# Patient Record
Sex: FEMALE | Race: WHITE | NOT HISPANIC OR LATINO | Employment: UNEMPLOYED | URBAN - METROPOLITAN AREA
[De-identification: names, ages, dates, MRNs, and addresses within clinical notes are randomized per-mention and may not be internally consistent; named-entity substitution may affect disease eponyms.]

---

## 2018-02-27 ENCOUNTER — CONVERSION ENCOUNTER (OUTPATIENT)
Dept: SURGERY | Facility: CLINIC | Age: 48
End: 2018-02-27

## 2018-02-27 ENCOUNTER — OFFICE VISIT CONVERTED (OUTPATIENT)
Dept: SURGERY | Facility: CLINIC | Age: 48
End: 2018-02-27
Attending: SURGERY

## 2018-04-23 ENCOUNTER — OFFICE VISIT CONVERTED (OUTPATIENT)
Dept: SURGERY | Facility: CLINIC | Age: 48
End: 2018-04-23
Attending: SURGERY

## 2018-04-30 ENCOUNTER — OFFICE VISIT CONVERTED (OUTPATIENT)
Dept: ORTHOPEDIC SURGERY | Facility: CLINIC | Age: 48
End: 2018-04-30
Attending: ORTHOPAEDIC SURGERY

## 2019-06-10 ENCOUNTER — CONVERSION ENCOUNTER (OUTPATIENT)
Dept: SURGERY | Facility: CLINIC | Age: 49
End: 2019-06-10

## 2019-06-10 ENCOUNTER — OFFICE VISIT CONVERTED (OUTPATIENT)
Dept: SURGERY | Facility: CLINIC | Age: 49
End: 2019-06-10
Attending: SURGERY

## 2019-07-11 ENCOUNTER — HOSPITAL ENCOUNTER (OUTPATIENT)
Dept: PERIOP | Facility: HOSPITAL | Age: 49
Setting detail: HOSPITAL OUTPATIENT SURGERY
Discharge: HOME | End: 2019-07-11
Attending: SURGERY

## 2019-08-02 ENCOUNTER — OFFICE VISIT CONVERTED (OUTPATIENT)
Dept: SURGERY | Facility: CLINIC | Age: 49
End: 2019-08-02
Attending: SURGERY

## 2019-09-16 ENCOUNTER — OFFICE VISIT CONVERTED (OUTPATIENT)
Dept: SURGERY | Facility: CLINIC | Age: 49
End: 2019-09-16
Attending: SURGERY

## 2019-11-08 ENCOUNTER — OFFICE VISIT CONVERTED (OUTPATIENT)
Dept: SURGERY | Facility: CLINIC | Age: 49
End: 2019-11-08
Attending: SURGERY

## 2019-12-13 ENCOUNTER — HOSPITAL ENCOUNTER (OUTPATIENT)
Dept: SURGERY | Facility: HOSPITAL | Age: 49
Setting detail: HOSPITAL OUTPATIENT SURGERY
Discharge: HOME | End: 2019-12-13
Attending: SURGERY

## 2020-02-03 ENCOUNTER — HOSPITAL ENCOUNTER (OUTPATIENT)
Dept: ONCOLOGY | Facility: HOSPITAL | Age: 50
Discharge: HOME | End: 2020-02-03
Attending: NURSE PRACTITIONER

## 2020-02-03 LAB
ALBUMIN SERPL-MCNC: 3.8 G/DL (ref 3.5–5)
ALBUMIN/GLOB SERPL: 1.3 {RATIO} (ref 1.4–2.6)
ALP SERPL-CCNC: 79 U/L (ref 42–98)
ALT SERPL-CCNC: 6 U/L (ref 10–40)
ANION GAP SERPL CALC-SCNC: 13 MMOL/L (ref 8–19)
AST SERPL-CCNC: 6 U/L (ref 15–50)
BASOPHILS # BLD AUTO: 0.02 10*3/UL (ref 0–0.2)
BASOPHILS # BLD AUTO: 0.03 10*3/UL (ref 0–0.2)
BASOPHILS NFR BLD AUTO: 0.2 % (ref 0–3)
BASOPHILS NFR BLD AUTO: 0.3 % (ref 0–3)
BILIRUB SERPL-MCNC: 0.16 MG/DL (ref 0.2–1.3)
BUN SERPL-MCNC: 12 MG/DL (ref 5–25)
BUN/CREAT SERPL: 14 {RATIO} (ref 6–20)
CALCIUM SERPL-MCNC: 9.3 MG/DL (ref 8.7–10.4)
CALCIUM SPEC-SCNC: 9.1 MG/DL (ref 8.7–10.4)
CHLORIDE SERPL-SCNC: 105 MMOL/L (ref 99–111)
CONV ABS IMM GRAN: 0.01 10*3/UL (ref 0–0.54)
CONV ABS IMM GRAN: 0.07 10*3/UL (ref 0–0.2)
CONV CO2: 20 MMOL/L (ref 22–32)
CONV EOSINOPHILS PERCENT BY MANUAL COUNT: 0.9 % (ref 0–7)
CONV IMMATURE GRAN: 0.1 % (ref 0–0.4)
CONV IMMATURE GRAN: 0.7 % (ref 0–1.8)
CONV TOTAL PROTEIN: 6.8 G/DL (ref 6.3–8.2)
CREAT UR-MCNC: 0.85 MG/DL (ref 0.5–0.9)
DEPRECATED RDW RBC AUTO: 60.6 FL (ref 36.4–46.3)
EOSINOPHIL # BLD AUTO: 0.09 10*3/UL (ref 0–0.7)
EOSINOPHIL # BLD AUTO: 0.9 % (ref 0–7)
EOSINOPHIL # BLD MANUAL: 0.09 10*3/UL (ref 0–0.7)
ERYTHROCYTE [DISTWIDTH] IN BLOOD BY AUTOMATED COUNT: 15.9 % (ref 11.5–14.5)
ERYTHROCYTE [DISTWIDTH] IN BLOOD BY AUTOMATED COUNT: 16.2 % (ref 11.7–14.4)
ERYTHROCYTE [DISTWIDTH] IN BLOOD BY AUTOMATED COUNT: 59 FL
GFR SERPLBLD BASED ON 1.73 SQ M-ARVRAT: >60 ML/MIN/{1.73_M2}
GLOBULIN UR ELPH-MCNC: 3 G/DL (ref 2–3.5)
GLUCOSE SERPL-MCNC: 94 MG/DL (ref 65–99)
HBA1C MFR BLD: 12.8 G/DL (ref 12–16)
HCT VFR BLD AUTO: 39.6 % (ref 37–47)
HCT VFR BLD AUTO: 40.4 % (ref 37–47)
HGB BLD-MCNC: 12.8 G/DL (ref 12–16)
LYMPHOCYTES # BLD AUTO: 1.76 10*3/UL (ref 1–5)
LYMPHOCYTES # BLD AUTO: 1.79 10*3/UL (ref 1–5)
LYMPHOCYTES NFR BLD AUTO: 18.1 % (ref 20–45)
LYMPHOCYTES NFR BLD AUTO: 18.1 % (ref 20–45)
MCH RBC QN AUTO: 31.4 PG (ref 27–31)
MCH RBC QN AUTO: 32.2 PG (ref 27–31)
MCHC RBC AUTO-ENTMCNC: 31.7 G/DL (ref 33–37)
MCHC RBC AUTO-ENTMCNC: 32.3 G/DL (ref 33–37)
MCV RBC AUTO: 99 FL (ref 81–99)
MCV RBC AUTO: 99.7 FL (ref 81–99)
MONOCYTES # BLD AUTO: 0.35 10*3/UL (ref 0.2–1.2)
MONOCYTES # BLD AUTO: 0.39 10*3/UL (ref 0.2–1.2)
MONOCYTES NFR BLD AUTO: 3.9 % (ref 3–10)
MONOCYTES NFR BLD MANUAL: 3.6 % (ref 3–10)
NEUTROPHILS # BLD AUTO: 7.49 10*3/UL (ref 2–8)
NEUTROPHILS # BLD AUTO: 7.55 10*3/UL (ref 2–8)
NEUTROPHILS NFR BLD AUTO: 76.2 % (ref 30–85)
NEUTROPHILS NFR BLD MANUAL: 77 % (ref 30–85)
NRBC CBCN: 0 % (ref 0–0.7)
OSMOLALITY SERPL CALC.SUM OF ELEC: 278 MOSM/KG (ref 273–304)
PLATELET # BLD AUTO: 289 10*3/UL (ref 130–400)
PLATELET # BLD AUTO: 307 10*3/UL (ref 130–400)
PMV BLD AUTO: 10 FL (ref 7.4–10.4)
PMV BLD AUTO: 10.9 FL (ref 9.4–12.3)
POTASSIUM SERPL-SCNC: 4.4 MMOL/L (ref 3.5–5.3)
RBC # BLD AUTO: 3.97 10*6/UL (ref 4.2–5.4)
RBC MORPH BLD: 4.08 10*6/UL (ref 4.2–5.4)
SODIUM SERPL-SCNC: 134 MMOL/L (ref 135–147)
WBC # BLD AUTO: 9.73 10*3/UL (ref 4.8–10.8)
WBC # BLD AUTO: 9.91 10*3/UL (ref 4.8–10.8)

## 2020-02-05 LAB
C DIFF TOX B STL QL CT TISS CULT: NEGATIVE
CONV 027 TOXIN: NEGATIVE

## 2020-02-07 ENCOUNTER — HOSPITAL ENCOUNTER (OUTPATIENT)
Dept: CT IMAGING | Facility: HOSPITAL | Age: 50
Discharge: HOME | End: 2020-02-07
Attending: NURSE PRACTITIONER

## 2020-02-07 LAB — BACTERIA SPEC AEROBE CULT: NORMAL

## 2020-02-17 ENCOUNTER — OFFICE VISIT CONVERTED (OUTPATIENT)
Dept: ONCOLOGY | Facility: HOSPITAL | Age: 50
End: 2020-02-17
Attending: NURSE PRACTITIONER

## 2020-02-17 ENCOUNTER — HOSPITAL ENCOUNTER (OUTPATIENT)
Dept: ONCOLOGY | Facility: HOSPITAL | Age: 50
Discharge: HOME | End: 2020-02-17
Attending: NURSE PRACTITIONER

## 2020-02-24 ENCOUNTER — OFFICE VISIT CONVERTED (OUTPATIENT)
Dept: SURGERY | Facility: CLINIC | Age: 50
End: 2020-02-24
Attending: SURGERY

## 2020-02-27 ENCOUNTER — HOSPITAL ENCOUNTER (OUTPATIENT)
Dept: MAMMOGRAPHY | Facility: HOSPITAL | Age: 50
Discharge: HOME | End: 2020-02-27
Attending: NURSE PRACTITIONER

## 2020-03-11 ENCOUNTER — HOSPITAL ENCOUNTER (OUTPATIENT)
Dept: GASTROENTEROLOGY | Facility: HOSPITAL | Age: 50
Setting detail: HOSPITAL OUTPATIENT SURGERY
Discharge: HOME | End: 2020-03-11
Attending: SURGERY

## 2020-08-10 ENCOUNTER — HOSPITAL ENCOUNTER (OUTPATIENT)
Dept: OTHER | Facility: HOSPITAL | Age: 50
Discharge: HOME | End: 2020-08-10
Attending: NURSE PRACTITIONER

## 2020-08-10 LAB
BASOPHILS # BLD AUTO: 0.03 10*3/UL (ref 0–0.2)
BASOPHILS NFR BLD AUTO: 0.2 % (ref 0–3)
CONV ABS IMM GRAN: 0.02 10*3/UL (ref 0–0.54)
CONV EOSINOPHILS PERCENT BY MANUAL COUNT: 0.6 % (ref 0–7)
CONV IMMATURE GRAN: 0.2 % (ref 0–0.4)
EOSINOPHIL # BLD MANUAL: 0.07 10*3/UL (ref 0–0.7)
ERYTHROCYTE [DISTWIDTH] IN BLOOD BY AUTOMATED COUNT: 13.9 % (ref 11.5–14.5)
ERYTHROCYTE [DISTWIDTH] IN BLOOD BY AUTOMATED COUNT: 53 FL
HBA1C MFR BLD: 12.9 G/DL (ref 12–16)
HCT VFR BLD AUTO: 40.7 % (ref 37–47)
LYMPHOCYTES # BLD AUTO: 1.55 10*3/UL (ref 1–5)
LYMPHOCYTES NFR BLD AUTO: 12.2 % (ref 20–45)
MCH RBC QN AUTO: 32.4 PG (ref 27–31)
MCHC RBC AUTO-ENTMCNC: 31.7 G/DL (ref 33–37)
MCV RBC AUTO: 102.3 FL (ref 81–99)
MONOCYTES # BLD AUTO: 0.52 10*3/UL (ref 0.2–1.2)
MONOCYTES NFR BLD MANUAL: 4.1 % (ref 3–10)
NEUTROPHILS # BLD AUTO: 10.52 10*3/UL (ref 2–8)
NEUTROPHILS NFR BLD MANUAL: 82.7 % (ref 30–85)
PLATELET # BLD AUTO: 268 10*3/UL (ref 130–400)
PMV BLD AUTO: 10.1 FL (ref 7.4–10.4)
RBC MORPH BLD: 3.98 10*6/UL (ref 4.2–5.4)
WBC # BLD AUTO: 12.71 10*3/UL (ref 4.8–10.8)

## 2020-08-19 ENCOUNTER — OFFICE VISIT CONVERTED (OUTPATIENT)
Dept: ONCOLOGY | Facility: HOSPITAL | Age: 50
End: 2020-08-19
Attending: NURSE PRACTITIONER

## 2020-08-19 ENCOUNTER — HOSPITAL ENCOUNTER (OUTPATIENT)
Dept: ONCOLOGY | Facility: HOSPITAL | Age: 50
Discharge: HOME | End: 2020-08-19
Attending: NURSE PRACTITIONER

## 2020-11-13 ENCOUNTER — HOSPITAL ENCOUNTER (OUTPATIENT)
Dept: INFUSION THERAPY | Facility: HOSPITAL | Age: 50
Setting detail: RECURRING SERIES
Discharge: HOME | End: 2020-11-23
Attending: FAMILY MEDICINE

## 2020-12-29 ENCOUNTER — OFFICE VISIT CONVERTED (OUTPATIENT)
Dept: UROLOGY | Facility: CLINIC | Age: 50
End: 2020-12-29
Attending: UROLOGY

## 2021-01-14 ENCOUNTER — PROCEDURE VISIT CONVERTED (OUTPATIENT)
Dept: UROLOGY | Facility: CLINIC | Age: 51
End: 2021-01-14
Attending: UROLOGY

## 2021-03-15 ENCOUNTER — HOSPITAL ENCOUNTER (OUTPATIENT)
Dept: CT IMAGING | Facility: HOSPITAL | Age: 51
Discharge: HOME | End: 2021-03-15
Attending: NURSE PRACTITIONER

## 2021-03-15 LAB
ALBUMIN SERPL-MCNC: 3.7 G/DL (ref 3.5–5)
ALBUMIN/GLOB SERPL: 1.1 {RATIO} (ref 1.4–2.6)
ALP SERPL-CCNC: 72 U/L (ref 42–98)
ALT SERPL-CCNC: 17 U/L (ref 10–40)
ANION GAP SERPL CALC-SCNC: 13 MMOL/L (ref 8–19)
AST SERPL-CCNC: 21 U/L (ref 15–50)
BASOPHILS # BLD AUTO: 0.04 10*3/UL (ref 0–0.2)
BASOPHILS NFR BLD AUTO: 0.4 % (ref 0–3)
BILIRUB SERPL-MCNC: <0.15 MG/DL (ref 0.2–1.3)
BUN SERPL-MCNC: 14 MG/DL (ref 5–25)
BUN/CREAT SERPL: 15 {RATIO} (ref 6–20)
CALCIUM SERPL-MCNC: 8.9 MG/DL (ref 8.7–10.4)
CHLORIDE SERPL-SCNC: 104 MMOL/L (ref 99–111)
CONV ABS IMM GRAN: 0.04 10*3/UL (ref 0–0.2)
CONV CO2: 24 MMOL/L (ref 22–32)
CONV IMMATURE GRAN: 0.4 % (ref 0–1.8)
CONV TOTAL PROTEIN: 7.1 G/DL (ref 6.3–8.2)
CREAT UR-MCNC: 0.92 MG/DL (ref 0.5–0.9)
DEPRECATED RDW RBC AUTO: 46.8 FL (ref 36.4–46.3)
EOSINOPHIL # BLD AUTO: 0.14 10*3/UL (ref 0–0.7)
EOSINOPHIL # BLD AUTO: 1.5 % (ref 0–7)
ERYTHROCYTE [DISTWIDTH] IN BLOOD BY AUTOMATED COUNT: 13 % (ref 11.7–14.4)
GFR SERPLBLD BASED ON 1.73 SQ M-ARVRAT: >60 ML/MIN/{1.73_M2}
GLOBULIN UR ELPH-MCNC: 3.4 G/DL (ref 2–3.5)
GLUCOSE SERPL-MCNC: 85 MG/DL (ref 65–99)
HCT VFR BLD AUTO: 42.5 % (ref 37–47)
HGB BLD-MCNC: 14 G/DL (ref 12–16)
LDH SERPL-CCNC: 252 U/L (ref 120–240)
LYMPHOCYTES # BLD AUTO: 1.74 10*3/UL (ref 1–5)
LYMPHOCYTES NFR BLD AUTO: 18.8 % (ref 20–45)
MCH RBC QN AUTO: 32.3 PG (ref 27–31)
MCHC RBC AUTO-ENTMCNC: 32.9 G/DL (ref 33–37)
MCV RBC AUTO: 98.2 FL (ref 81–99)
MONOCYTES # BLD AUTO: 0.46 10*3/UL (ref 0.2–1.2)
MONOCYTES NFR BLD AUTO: 5 % (ref 3–10)
NEUTROPHILS # BLD AUTO: 6.83 10*3/UL (ref 2–8)
NEUTROPHILS NFR BLD AUTO: 73.9 % (ref 30–85)
NRBC CBCN: 0 % (ref 0–0.7)
OSMOLALITY SERPL CALC.SUM OF ELEC: 282 MOSM/KG (ref 273–304)
PLATELET # BLD AUTO: 227 10*3/UL (ref 130–400)
PMV BLD AUTO: 9.9 FL (ref 9.4–12.3)
POTASSIUM SERPL-SCNC: 4.8 MMOL/L (ref 3.5–5.3)
RBC # BLD AUTO: 4.33 10*6/UL (ref 4.2–5.4)
SODIUM SERPL-SCNC: 136 MMOL/L (ref 135–147)
WBC # BLD AUTO: 9.25 10*3/UL (ref 4.8–10.8)

## 2021-03-30 ENCOUNTER — HOSPITAL ENCOUNTER (OUTPATIENT)
Dept: ONCOLOGY | Facility: HOSPITAL | Age: 51
Discharge: HOME | End: 2021-03-30
Attending: NURSE PRACTITIONER

## 2021-03-30 ENCOUNTER — OFFICE VISIT CONVERTED (OUTPATIENT)
Dept: ONCOLOGY | Facility: HOSPITAL | Age: 51
End: 2021-03-30
Attending: NURSE PRACTITIONER

## 2021-05-10 NOTE — PROCEDURES
Procedure Note      Patient Name: Vaishnavi Moses   Patient ID: [id number]   Sex: Female   YOB: 1970    Primary Care Provider: Anais OWUSU   Referring Provider: Anais OWUSU    Visit Date: January 14, 2021    Provider: Ofelia Hassan MD   Location: [facility]   Location Address: [address] [place] [address]  [id number]   Location Phone: [phone]          Cystoscopy Procedure:  PROCEDURE: Upon visual inspection, patient with significant vaginal atrophy, narrowed introitus and some mucosal erythema. No visible or palpable urethral abnormalities. Flexible cystoscope was passed per urethra into the bladder without difficulty after proper consent. The bladder was inspected in a systematic meridian fashion. There were no tumors, lesions, stones, or other abnormalities noted within the bladder. Of note, there was no increased vascularity as well. Both ureteral orifices were identified and were normal in appearance. The flexible cystoscope was removed. The patient tolerated the procedure well.   FOLLOW UP OFFICE NOTE: Denies UTI symptoms today. Took a prophylactic Macrobid as prescribed yesterday but had vomiting and had to leave work early. No other complaints or changes since last visit.           Assessment  · Recurrent UTI     599.0/N39.0  · Vaginal atrophy     627.3/N95.2    Problems Reconciled  Plan  · Orders  o Cystoscopy (34165) - 599.0/N39.0, 627.3/N95.2 - 01/14/2021  · Medications  o Medications have been Reconciled  o Transition of Care or Provider Policy  · Instructions  o Electronically Identified Patient Education Materials Provided Electronically  · Referrals  o ID: [id number] Date: 12/28/2020 Type: Inbound  Specialty: Urology     Tolerated procedure well.  Instructions provided.  Cystoscopic and physical exam findings discussed with patient and  at length.  Patient noted to have significant vaginal atrophy which is known to contribute  significantly to recurrent urinary tract infections.    Recommend trial of localized estrogen cream.  Discussed with patient that vaginal atrophy is thinning, drying, and inflammation of the vaginal walls, due to drop in estrogen production.  Atrophic vaginitis frequently causes vaginal irritation and pain and also leads to urinary symptoms as well as increased susceptibility to urinary tract infections.  Recommend trial of vaginal estrogen cream  Side effects of localized estrogen discussed with patient. Most common side effect is vaginal discharge. Discussed associated black box warning and contraindications, does not exhibit any of these. Agreeable to trial of localized estrogen cream.  Order place    Given patient's intolerance to antibiotic, Macrobid, do not believe her a good candidate for daily prophylaxis; also with history of resistant bacteria.  Thus we will proceed with on-demand UTI treatment as symptoms arrives.  Discussed the importance of obtaining urine culture prior to treatment with antibiotics; specimen cups provided to her for specimen collection.  Patient to notify office if UTI symptoms so that urine culture may be obtained    Follow-up in 6 months or earlier as needed  All question addressed             Electronically Signed by: Ofelia Hassan MD -Author on January 14, 2021 09:38:08 AM

## 2021-05-10 NOTE — H&P
"   History and Physical      Patient Name: Vaishnavi Moses   Patient ID: [id number]   Sex: Female   YOB: 1970    Primary Care Provider: Anais OWUSU   Referring Provider: Anais OWUSU    Visit Date: December 29, 2020    Provider: Ofelia Hassan MD   Location: [facility]   Location Address: [address] [place] [address]  [id number]   Location Phone: [phone]          Chief Complaint  · Patient is here for urological concerns      History Of Present Illness     100,000 E. coli, ESBL  9/2/2020: Klebsiella, [id number]  6/29/2020: >100,000 ESBL    UTI diagnostic report, molecular diagnostic performed, 11/2/2020: Positive only for E. coli no resistance noted\">50-year-old lady presents for further evaluation of recurrent urinary tract infection, recent episode of pyelonephritis requiring admission for 3 days. States that she is now better. Denies associated fever, but had cold sweats, felt bad, and had a lot of pain, both sides of back and abdomen. States that she was on two abx prior to presentation to ER when she had pyelonephritis.  H/o Amor, this last year had 4-5 infections, prior to this, never had a infection.  Reports symptom of uti as a lot of pain in side which shifts and goes all over; no associated dysuria or changes in urinary habits.  Denies hematuria.      H/o chronic back pain.   Denies prior urologic w/u.   H/o hysterectomy, has ovaries; thinks she may have gone through menopause.   Urine culture   11/9/2020: > 100,000 E. coli, ESBL  9/2/2020: Klebsiella, [id number]  6/29/2020: >100,000 ESBL    UTI diagnostic report, molecular diagnostic performed, 11/2/2020: Positive only for E. coli no resistance noted      CT abdomen pelvis with contrast, 11/9/2020: There is age-indeterminate, but possibly acute, left-sided pyelonephritis and left ureteritis.  No such findings are seen contralaterally.    2. No obstructive uropathy is seen.  No hydronephrosis.  No " ureterolithiasis is identified.  No   nonobstructing nephrolithiasis is seen bilaterally. 3. There is possible age-indeterminate infectious/inflammatory colitis/proctitis, involving the rectosigmoid colon.  No definite pericolonic fluid collection is seen to suggest abscess.  4. No other acute findings are seen.       Past Medical History  Degeneration of lumbosacral intervertebral disc; High cholesterol; Limb Pain; Limb Swelling; Pain, Lumbar; Radiculopathy, lumbosacral; Spondylolisthesis , lumbar; Ulcer         Past Surgical History  Appendectomy; Cholecystectomy; Hernia; Hysterectomy-Abdominal         Medication List  Claritin 10 mg oral tablet; colestipol 1 gram oral tablet; Imitrex 25 mg oral tablet; Lasix 20 mg oral tablet; Lipitor 20 mg oral tablet; Macrobid 100 mg oral capsule; multivitamin oral; omeprazole 40 mg oral capsule,delayed release(DR/EC); oxybutynin chloride 5 mg oral tablet; potassium 99 mg oral tablet; Requip 0.5 mg oral tablet; Topamax 25 mg oral tablet; trazodone 100 mg oral tablet; Vitamin D2 1,250 mcg (50,000 unit) oral capsule; Zofran 4 mg oral tablet; Zoloft 50 mg oral tablet         Allergy List  PENICILLINS       Allergies Reconciled  Family Medical History  Heart Disease; Diabetes, unspecified type; *Unremarkable         Social History  Alcohol Use (Never); .; lives with other; Recreational Drug Use (Never); Tobacco (Current every day); Working         Review of Systems  · Constitutional  o Denies  o : fever, chills  · Eyes  o Denies  o : yellowish discoloration of eyes  · HENT  o Denies  o : difficulty swallowing  · Cardiovascular  o Denies  o : chest pain on exertion  · Respiratory  o Denies  o : shortness of breath  · Gastrointestinal  o Denies  o : nausea, vomiting  · Genitourinary  o Denies  o : additional genitourinary symptoms except as noted in the HPI  · Integument  o Denies  o : rash  · Neurologic  o Denies  o : tingling or numbness  · Musculoskeletal  o Denies  o :  "joint pain  · Endocrine  o Denies  o : weight gain, weight loss  · Heme-Lymph  o Denies  o : easy bleeding, easy bruising      Vitals  Date Time BP Position Site L\R Cuff Size HR RR TEMP (F) WT  HT  BMI kg/m2 BSA m2 O2 Sat FR L/min FiO2 HC       12/29/2020 09:43 AM       14  168lbs 8oz 5'  4\" 28.92 1.86             Physical Examination  · Constitutional  o Appearance  o : Well nourished, well developed patient in no acute distress.  · Eyes  o Conjunctivae  o : Conjunctivae normal  o Sclerae  o : Sclerae white  · Ears, Nose, Mouth and Throat  o Ears  o :   § Hearing  § : Intact to conversational voice both ears  § Ears  § : Normal  o Nose  o :   § External Nose  § : Appearance normal  · Neck  o Inspection/Palpation  o : Normal appearance, trachea midline  · Respiratory  o Respiratory Effort  o : Breathing unlabored without accessory muscle use  o Inspection of Chest  o : Normal appearance, no retractions  o Auscultation of Lungs  o : Normal breath sounds  · Cardiovascular  o Heart  o : Normal Rate  · Gastrointestinal  o Abdominal Examination  o : Appears soft and nondistended  · Genitourinary  o Bladder  o : nontender to palpation  · Skin and Subcutaneous Tissue  o General Inspection  o : No rashes, lesions, or areas of discoloration present  o General Palpation  o : Skin Turgor Normal  · Neurologic  o Mental Status Examination  o :   § Orientation  § : Alert and Oriented X3  o Gait and Station  o :   § Gait Screening  § : Ambulating without difficulty  · Psychiatric  o Mood and Affect  o : Mood normal, affect appropriate          Results  · In-Office Procedures  o Surgical procedure  § IOP - Bladder Scan/Residual Urine (20516)   § Specimen vol Ur: 7   o Lab procedure  § Automated Dipstick Urinalysis (Surg Spec) WITHOUT Micro HMH (95339)   § Color Ur: Yellow   § Clarity Ur: Slightly cloudy   § Glucose Ur Ql Strip: Negative   § Bilirub Ur Ql Strip: Negative   § Ketones Ur Ql Strip: Negative   § Sp Gr Ur Qn: 1.030 "   § Hgb Ur Ql Strip: Trace-Lysed   § pH Ur-LsCnc: 6.0   § Prot Ur Ql Strip: Negative   § Urobilinogen Ur Strip-mCnc: 0.2 E.U./dL   § Nitrite Ur Ql Strip: Negative   § WBC Est Ur Ql Strip: Negative      60ml/min/1.73m2     FINDINGS: Age-indeterminate inflammatory stranding is seen about the left kidney.  There is   enhancement and thickening of the wall of the left pelvicaliceal system and the left ureter.  The   findings may represent infectious/inflammatory ureteritis.  Left-sided pyelonephritis cannot be   excluded.  No obstructing ureteral calculus.  No nonobstructing nephrolithiasis is seen   bilaterally.  No perinephric stranding is seen on the right.  No hydronephrosis is identified   bilaterally.  There may be renal cortical scarring bilaterally.  There is probably a stable 1.2 cm   lower pole left renal cyst.  There may be other bilateral renal cysts.  They are probably benign.       There is a thickened appearance of the rectosigmoid colonic wall with surrounding inflammatory   change in the perirectal fat.  The findings may represent age-indeterminate colitis/proctitis.  The   remainder of the colon is unaffected.  No evidence by CT of stercoral ulceration.  No   pneumoperitoneum or pneumatosis.  No portal or mesenteric venous gas is seen to suggest ischemic   colitis.  There are scattered colonic diverticula.  Acute diverticulitis is thought to be less   likely.  No colonic obstruction.  No small bowel obstruction.  No small bowel wall thickening is   appreciated.  It is suspected that the patient has undergone appendectomy.       No change in the bilateral low density adrenal nodules, which probably represent benign adenomas.    The right adrenal nodule is larger than the left and measures about 2.8 cm in greatest diameter.    It has a CT number of approximately 29 Hounsfield units.  Atherosclerotic change is noted.  The   patient has undergone cholecystectomy.  There is a small hiatal hernia.  There are  stable   noncalcified bibasilar subpleural pulmonary nodules.  The findings are probably benign.  No acute   infiltrate is suspected in the imaged lung bases.  No cardiac enlargement.  No aggressive osseous   lesion is seen.  There is however bilateral L5 spondylolysis with grade 2-3 spondylolisthesis of L5   upon S1, estimated at 1.6 cm, seen previously.  MR examination of the lumbar spine may be helpful   in further imaging assessment of the findings, as bilateral foraminal narrowing is suspected.    There is diffuse hepatic steatosis with hepatomegaly.  The maximum craniocaudal dimension of the   right lobe of the liver is 19.5 cm.  No splenomegaly is seen.  There is a small left inguinal   hernia, measuring about 3 cm in greatest diameter.  It does not contain bowel.  It is similar in   appearance to the prior study.  The patient has undergone hysterectomy.  Degenerative changes   involve the bilateral sacroiliac joints.     CONCLUSION:   1. There is age-indeterminate, but possibly acute, left-sided pyelonephritis and left ureteritis.    No such findings are seen contralaterally.    2. No obstructive uropathy is seen.  No hydronephrosis.  No ureterolithiasis is identified.  No   nonobstructing nephrolithiasis is seen bilaterally.   3. There is possible age-indeterminate infectious/inflammatory colitis/proctitis, involving the   rectosigmoid colon.  No definite pericolonic fluid collection is seen to suggest abscess.  No   definite CT evidence of stercoral ulceration.  No pneumoperitoneum or pneumatosis.  No portal or   mesenteric venous gas.  No colonic obstruction is seen.  The remainder of the colon is unaffected.  4. No other acute findings are seen.    5. Please see above comments for further detail.              JAMSHID ASENCIO JR, MD         Electronically Signed and Approved By: JAMSHID ASENCIO JR, MD on 11/09/2020 at 21:01                     Until signed, this is an unconfirmed preliminary report that may  "contain  errors and is subject to change.                YAYO:  D:20  \">[facility]      PACS RADIOLOGY REPORT    Patient: ABAD BAILEY Acct: #[id number] Report: #[id number]    UNIT #: [id number]  DOS: 20 1905  INSURANCE:[employer] ORDER #:[id number]  LOCATION:ER   : 1970    PROVIDERS  ADMITTING:   ATTENDING:   FAMILY:  ARIADNA DENTON ORDERING:  LAISHA TRAN   OTHER:  DICTATING:  Alphonso Bishop MD, JR    REQ #:[id number]   EXAM:[id number] - CT ABDOMEN  PELVIS w CONTRAST  REASON FOR EXAM:  Abdominal Pain  REASON FOR VISIT:  REF BY  FOR CT SCAN/UTI    *******Signed******     PROCEDURE: CT ABDOMEN PELVIS WITH CONTRAST     COMPARISON: [facility], CT, CT CHEST ABD PEL W CONTRAST, 2020, 13:08.     INDICATIONS: LOWER ABDOMINAL PAIN.     TECHNIQUE: After obtaining the patient's consent, 581 CT images were created with non-ionic   intravenous contrast material.       PROTOCOL:   Standard imaging protocol performed      RADIATION:   DLP: 523mGy*cm    Automated exposure control was utilized to minimize radiation dose.   CONTRAST: 100cc Isovue 370 I.V.  LABS:   eGFR: >60ml/min/1.73m2     FINDINGS: Age-indeterminate inflammatory stranding is seen about the left kidney.  There is   enhancement and thickening of the wall of the left pelvicaliceal system and the left ureter.  The   findings may represent infectious/inflammatory ureteritis.  Left-sided pyelonephritis cannot be   excluded.  No obstructing ureteral calculus.  No nonobstructing nephrolithiasis is seen   bilaterally.  No perinephric stranding is seen on the right.  No hydronephrosis is identified   bilaterally.  There may be renal cortical scarring bilaterally.  There is probably a stable 1.2 cm   lower pole left renal cyst.  There may be other bilateral renal cysts.  They are probably benign.       There is a thickened appearance of the rectosigmoid " colonic wall with surrounding inflammatory   change in the perirectal fat.  The findings may represent age-indeterminate colitis/proctitis.  The   remainder of the colon is unaffected.  No evidence by CT of stercoral ulceration.  No   pneumoperitoneum or pneumatosis.  No portal or mesenteric venous gas is seen to suggest ischemic   colitis.  There are scattered colonic diverticula.  Acute diverticulitis is thought to be less   likely.  No colonic obstruction.  No small bowel obstruction.  No small bowel wall thickening is   appreciated.  It is suspected that the patient has undergone appendectomy.       No change in the bilateral low density adrenal nodules, which probably represent benign adenomas.    The right adrenal nodule is larger than the left and measures about 2.8 cm in greatest diameter.    It has a CT number of approximately 29 Hounsfield units.  Atherosclerotic change is noted.  The   patient has undergone cholecystectomy.  There is a small hiatal hernia.  There are stable   noncalcified bibasilar subpleural pulmonary nodules.  The findings are probably benign.  No acute   infiltrate is suspected in the imaged lung bases.  No cardiac enlargement.  No aggressive osseous   lesion is seen.  There is however bilateral L5 spondylolysis with grade 2-3 spondylolisthesis of L5   upon S1, estimated at 1.6 cm, seen previously.  MR examination of the lumbar spine may be helpful   in further imaging assessment of the findings, as bilateral foraminal narrowing is suspected.    There is diffuse hepatic steatosis with hepatomegaly.  The maximum craniocaudal dimension of the   right lobe of the liver is 19.5 cm.  No splenomegaly is seen.  There is a small left inguinal   hernia, measuring about 3 cm in greatest diameter.  It does not contain bowel.  It is similar in   appearance to the prior study.  The patient has undergone hysterectomy.  Degenerative changes   involve the bilateral sacroiliac joints.     CONCLUSION:    1. There is age-indeterminate, but possibly acute, left-sided pyelonephritis and left ureteritis.    No such findings are seen contralaterally.    2. No obstructive uropathy is seen.  No hydronephrosis.  No ureterolithiasis is identified.  No   nonobstructing nephrolithiasis is seen bilaterally.   3. There is possible age-indeterminate infectious/inflammatory colitis/proctitis, involving the   rectosigmoid colon.  No definite pericolonic fluid collection is seen to suggest abscess.  No   definite CT evidence of stercoral ulceration.  No pneumoperitoneum or pneumatosis.  No portal or   mesenteric venous gas.  No colonic obstruction is seen.  The remainder of the colon is unaffected.  4. No other acute findings are seen.    5. Please see above comments for further detail.              JAMSHID ASENCIO JR, MD         Electronically Signed and Approved By: JAMSHID ASENCIO JR, MD on 11/09/2020 at 21:01                     Until signed, this is an unconfirmed preliminary report that may contain  errors and is subject to change.                CARJI:  D:11/09/20 2101         Assessment  · Recurrent UTI     599.0/N39.0    Problems Reconciled  Plan  · Medications  o Medications have been Reconciled  o Transition of Care or Provider Policy  · Instructions  o Electronically Identified Patient Education Materials Provided Electronically  · Referrals  o ID: [id number] Date: 12/28/2020 Type: Inbound  Specialty: Urology     Urinary tract infection-no evidence on urine dip of infection today.  Discussed with patient that recurrent UTI is a common condition that is multifactorial in nature, difficult to treat, unlikely to completely eradicate, and the specific cause for recurrent infections is often unknown.  Encouraged behavioral modifications including fluid management, hydration, not delaying urgency when she needs to void.  Recommend cranberry supplementation and D-mannose daily to aid with prevention of urinary tract  infection.  Discussed with patient the importance of obtaining urine culture prior to treatment with antibiotics for urinary tract infection.    Plan for anatomic assessment via cystoscopy   Prophylactic Macrobid day of and day after cystoscopy    Consider antibiotic strategy after w/u via daily prophylaxis (although will be challenging with her history of resistant bacteria), self start therapy, or continued on demand treatment    Schedule cystoscopy, prophylactic Macrobid prior  All questions addressed      Total time for encounter greater than 35 minutes due to face to face time which dominated greater than 51% of the encounter including counseling and coordination of care and review of records                 Electronically Signed by: Ofelia Hassan MD -Author on December 29, 2020 04:45:00 PM

## 2021-05-14 VITALS — RESPIRATION RATE: 14 BRPM | HEIGHT: 64 IN | BODY MASS INDEX: 28.77 KG/M2 | WEIGHT: 168.5 LBS

## 2021-05-15 VITALS — RESPIRATION RATE: 14 BRPM | BODY MASS INDEX: 33.8 KG/M2 | WEIGHT: 198 LBS | HEIGHT: 64 IN

## 2021-05-15 VITALS — BODY MASS INDEX: 31.07 KG/M2 | RESPIRATION RATE: 14 BRPM | WEIGHT: 182 LBS | HEIGHT: 64 IN

## 2021-05-15 VITALS — RESPIRATION RATE: 16 BRPM | WEIGHT: 197.5 LBS | HEIGHT: 64 IN | BODY MASS INDEX: 33.72 KG/M2

## 2021-05-15 VITALS — BODY MASS INDEX: 33.46 KG/M2 | WEIGHT: 196 LBS | HEIGHT: 64 IN | RESPIRATION RATE: 16 BRPM

## 2021-05-15 VITALS — BODY MASS INDEX: 32.99 KG/M2 | RESPIRATION RATE: 14 BRPM | HEIGHT: 64 IN | WEIGHT: 193.25 LBS

## 2021-05-16 VITALS — OXYGEN SATURATION: 97 % | HEART RATE: 76 BPM | WEIGHT: 181 LBS | HEIGHT: 64 IN | BODY MASS INDEX: 30.9 KG/M2

## 2021-05-16 VITALS — HEIGHT: 64 IN | WEIGHT: 175 LBS | BODY MASS INDEX: 29.88 KG/M2 | RESPIRATION RATE: 14 BRPM

## 2021-05-16 VITALS — RESPIRATION RATE: 14 BRPM | BODY MASS INDEX: 29.88 KG/M2 | WEIGHT: 175 LBS | HEIGHT: 64 IN

## 2021-05-28 VITALS
RESPIRATION RATE: 18 BRPM | TEMPERATURE: 98 F | OXYGEN SATURATION: 96 % | BODY MASS INDEX: 29.96 KG/M2 | HEART RATE: 72 BPM | HEART RATE: 90 BPM | SYSTOLIC BLOOD PRESSURE: 117 MMHG | RESPIRATION RATE: 18 BRPM | DIASTOLIC BLOOD PRESSURE: 83 MMHG | HEIGHT: 64 IN | TEMPERATURE: 98.1 F | SYSTOLIC BLOOD PRESSURE: 111 MMHG | OXYGEN SATURATION: 100 % | DIASTOLIC BLOOD PRESSURE: 75 MMHG | WEIGHT: 175.49 LBS

## 2021-05-28 VITALS
RESPIRATION RATE: 18 BRPM | HEART RATE: 76 BPM | SYSTOLIC BLOOD PRESSURE: 124 MMHG | WEIGHT: 182.54 LBS | DIASTOLIC BLOOD PRESSURE: 84 MMHG | BODY MASS INDEX: 31.33 KG/M2 | OXYGEN SATURATION: 97 % | TEMPERATURE: 97.6 F

## 2021-05-28 NOTE — PROGRESS NOTES
Patient: ABAD MOSES     Acct: [id number]     Report: #[id number]  UNIT #: [id number]     : 1970    Encounter Date:2020  PRIMARY CARE: ARIADNA DENTON  ***Signed***  --------------------------------------------------------------------------------------------------------------------  NURSE INTAKE      Visit Type      Established Patient Visit            Chief Complaint      NEUTROPHILIA            Referring Provider/Copies To      Referring Provider:  ARIADNA DENTON      Primary Care Provider:  ARIADNA DENTON      Copies To:   ARIADNA DENTON            History and Present Illness      Past History      Date:  2018      Mrs. Moses is a very pleasant 48-year-old female without any significant past    medical history. She is on several prescribed medications. She underwent     physical examination by PCP  and underwent lab workup which was remarkable for     leukocytosis. Her most recent CBC is not available but she reports a WBC count     of 17.  A past CBC on 11/15/17 shows a white count of 7, hemoglobin 12.1 and a     platelet count of 229,000.       Patient is completely asymptomatic denies any bleeding or bruising. Patient also     denies any family history of blood disorder, she reports over 30 pack history     of smoking.She also did denies any history of jaundice or hepatitis.            HPI - Hematology Interim      1) Leukocytosis:              Ms. Moses presents for 6 month follow up for leukocytosis. She reports she     is sill smoking about a pack a day. She does report she was given Nicotine     patches by her PCP, but has not started using them yet. In addition, she reports    she did see GI for an evaluation for the colonic thickening seen on previous CT     and was diagnosed with diverticulosis. She reports she was started on Colestipol    for this. Her last CBC does still show an elevated WBC count up to 12.71 with     normal hemoglobin, platelet count and  differential. Previous lab was normal on     WBC count in February. She denies any fevers, chills, productive cough.                          2) Pulmonary nodules:            Patient had a CT in February 2020  which did show stable pulmonary nodules, and     stable low grade mediastinal and hilar lymphadenopathy.             3) Tobacco abuse:             Chronic tobacco abuse of 1 PPD for many years. She has Nicotine patches she will    start for tobacco cessation. Discussed with patient, smoking cessation. She has     not started Nicotine patches.            Most Recent Lab Findings      Laboratory Tests      8/10/20 10:15            PAST, FAMILY   Past Surgical History            2 HERNIA REMOVED JULY 2019 IN [place] [facility] - DR SQUIRES            Social History      Lives independently:  No            Tobacco Use      Tobacco status:  Light tobacco smoker      Smoking packs/day:  0.5      Quit status:  Considering quitting            Alcohol Use      Alcohol intake:  None            Substance Use      Substance use:  Denies use            REVIEW OF SYSTEMS      General:  Denies: Appetite Change, Fatigue, Fever, Night Sweats, Weight Gain,     Weight Loss      Eye:  Denies Blurred Vision, Denies Corrective Lenses, Denies Diplopia, Denies     Vision Changes      ENT:  Denies Headache, Denies Hearing Loss, Denies Hoarseness, Denies Sore     Throat      Cardiovascular:  Denies Chest Pain, Denies Palpitations      Respiratory:  Denies: Cough, Coughing Blood, Productive Cough, Shortness of Air,    Wheezing      Gastrointestinal:  Denies Bloody Stools, Denies Constipation, Denies Diarrhea,     Denies Nausea/Vomiting, Denies Problem Swallowing, Denies Unable to Control     Bowels      Genitourinary:  Denies Blood in Urine, Denies Incontinence, Denies Painful     Urination      Musculoskeletal:  Denies Back Pain, Denies Muscle Pain, Denies Painful Joints      Integumentary:  Denies Itching, Denies Lesions, Denies Rash       Neurologic:  Denies Dizziness; Admits Numbness\Tingling (ARMS AND HANDS); Denies    Seizures      Psychiatric:  Denies Anxiety, Denies Depression      Endocrine:  Denies Cold Intolerance, Denies Heat Intolerance      Hematologic/Lymphatic:  Admits Bruising; Denies Bleeding, Denies Enlarged Lymph     Nodes      Reproductive:  Denies: Menopause, Heavy Periods, Pregnant, Still Menstruating            VITAL SIGNS AND SCORES      Vitals      Temperature 98.1 F / 36.72 C - Temporal      Pulse 72      Respirations 18      Blood Pressure 111/75 Sitting, Left Arm      Pulse Oximetry 96%, ROOM AIR            Pain Score      Experiencing any pain?:  No      Pain Scale Used:  Numerical      Pain Intensity:  0            Fatigue Score      Experiencing any fatigue?:  No      Fatigue (0-10 scale):  0 (none)            EXAM      General appearance:  in no apparent distress, cooperative, appears stated age.      HEENT: No pallor, no icterus, oral mucosa moist      Neck: Supple, trachea central-not deviated      Lymph nodes: none palpable peripherally      Cardiovascular: S1-S2 heard, no murmurs, no rubs, no gallops.      Breast exam:      Respiratory: Clear to auscultation bilaterally, no adventitious sounds      Abdomen/gastro: Soft, nontender, no palpable hepatosplenomegaly, bowel sounds he    mary      Skin: No lesions, no rashes, no petechiae.      Extremities: No pedal edema, peripheral pulses felt, no clubbing      Musculoskeletal: Normal tone, no rigidity of muscles; range of motion intact      Spine: No deformities      Psychiatric: Alert and oriented x3, appropriate affect, intact judgment      Neurologic: Cranial nerves II through XII grossly intact, no gross neurological     deficits noted.            PREVENTION      Hx Influenza Vaccination:  Yes      Date Influenza Vaccine Given:  Oct 1, 2019      Influenza Vaccine Declined:  No      2 or More Falls in Past Year?:  No      Fall Past Year with Injury?:  No      Hx  Pneumococcal Vaccination:  No      Encouraged to follow-up with:  PCP regarding preventative exams.      Chart initiated by      LIZZY THOMAS            ALLERGY/MEDS      Allergies      Coded Allergies:             PENICILLINS (Verified  Adverse Reaction, Unknown, UNSURE, 8/19/20)                  PATIENT STATES SHE DOESN'T REMEMBER WHAT KIND OF REACTION                  SHE HAD TO PCN.            Medications      Last Reconciled on 8/19/20 13:21 by SHEBA FUNEZ      Colestipol HCl (Colestipol HCl) 1 Gm Tablet      1 GM PO BID for 30 Days, #60 TAB         Reported         2/3/20       Omeprazole (Omeprazole*) 40 Mg Capsule      40 MG PO QDAY, #60 CAP 0 Refills         Reported         12/13/19       Sertraline HCl (Sertraline*) 50 Mg Tablet      50 MG PO HS, #30 TAB 0 Refills         Reported         12/13/19       Atorvastatin (Atorvastatin) 20 Mg Tablet      20 MG PO HS, #30 TAB 0 Refills         Reported         12/13/19       Oxybutynin Chloride ER (Oxybutynin Chloride ER) 15 Mg Tab.er.24      5 MG PO QDAY, TAB         Reported         12/13/19       Ondansetron Odt (ONDANSETRON ODT) 4 Mg Tab.rapdis      4 MG PO Q4H PRN for NAUSEA, #10 TAB.RAPDIS 0 Refills         Prov: Lamont Amador         7/11/19       Potassium Chloride (K-Dur*) 10 Meq Tab.er.prt      10 MEQ PO QDAY PRN for SWELLING, #30 TAB 0 Refills         Reported         6/7/18       SUMAtriptan Succinate (Imitrex) 25 Mg Tablet      25 MG PO ASDIR PRN for MIGRAINE, TAB         Reported         11/8/17       Topiramate (Topamax*) 25 Mg Tablet      25 MG PO HS, TAB         Reported         9/12/17       Furosemide* (Lasix*) 20 Mg Tablet      20 MG PO QDAY PRN for SWELLING, #30 TAB 0 Refills         Reported         9/12/17       Loratadine (Claritin) 10 Mg Tablet      10 MG PO QDAY, #30 TAB 0 Refills         Reported         9/12/17       rOPINIRole HCl (rOPINIRole HCl) 0.5 Mg Tab      0.5 MG PO HS for 30 Days, #30 TAB         Reported          9/12/17       traZODone HCl (Desyrel) 150 Mg Tablet      150 MG PO HS, #30 TAB 0 Refills         Reported         1/7/16      Medications Reviewed:  No Changes made to meds            IMPRESSION/PLAN      Impression      1) Leukocytosis:              Ms. Moses presents for 6 month follow up for leukocytosis. She reports she     is sill smoking about a pack a day. She does report she was given Nicotine     patches by her PCP, but has not started using them yet. In addition, she reports    she did see GI for an evaluation for the colonic thickening seen on previous CT     and was diagnosed with diverticulosis. She reports she was started on Colestipol    for this. Her last CBC does still show an elevated WBC count up to 12.71 with     normal hemoglobin, platelet count and differential. Previous lab was normal on     WBC count in February. She denies any fevers, chills, productive cough.             In addition, her leukocytosis is felt to be related to chronic tobacco abuse,     but is reasonal to consider other underlying inflammatory disorders including     diverticulosis. I have discussed with the patient smoking cessation. She will     continue close follow up with GI for the diverticulosis.                          2) Pulmonary nodules:            Patient had a CT in February 2020  which did show stable pulmonary nodules, and     stable low grade mediastinal and hilar lymphadenopathy. She will need a repeat     CT scan of the chest in 6 months to evaluate pulmonary nodules.             3) Tobacco abuse:             Chronic tobacco abuse of 1 PPD for many years. She has Nicotine patches she will    start for tobacco cessation. Discussed with patient, smoking cessation. She has     not started Nicotine patches.            Diagnosis      Leukocytosis, unspecified - D72.829            Pulmonary nodules - R91.8            Notes      New Diagnostics      * CBC With Auto Diff, 6 Months         Dx: Leukocytosis,  unspecified - D72.829      * CMP Comp Metabolic Panel, 6 WEEKS         Dx: Leukocytosis, unspecified - D72.829      * LDH, Routine         Dx: Leukocytosis, unspecified - D72.829      * Chest W/O Cont CT, 6 Months         Dx: Pulmonary nodules - R91.8            Plan      1) labs in 6 months: CBC, CMP< LDH.             2) CT chest in 6 months to evaluate pulmonary nodules.             3) Smoking cessation needed. Patient has Nicotine patches to start.             Follow up with NP in 6 months with labs and CT scan done 1-2 days prior to     appointment.             Call with any questions or concerns.            Patient Education            DI for Diverticulosis      DI for Pulmonary Nodule      Patient Education Provided:  Yes            Electronically signed by SHEBA FUNEZ onc  08/19/2020 13:22       Disclaimer: Converted document may not contain table formatting or lab diagrams. Please see [employer] for the authenticated document.

## 2021-05-28 NOTE — PROGRESS NOTES
Patient: ABAD MOSES     Acct: [id number]     Report: #[id number]  UNIT #: [id number]     : 1970    Encounter Date:2021  PRIMARY CARE: ARIADNA DENTON  ***Signed***  --------------------------------------------------------------------------------------------------------------------  NURSE INTAKE      Visit Type      Established Patient Visit            Chief Complaint      NEUTROPHILLIA F/U            Referring Provider/Copies To      Primary Care Provider:  ARIADNA DENTON      Copies To:   ARIADNA DENTON            History and Present Illness      Past History      Date:  2018      Mrs. Moses is a very pleasant 48-year-old female without any significant past    medical history. She is on several prescribed medications. She underwent     physical examination by PCP  and underwent lab workup which was remarkable for     leukocytosis. Her most recent CBC is not available but she reports a WBC count     of 17.  A past CBC on 11/15/17 shows a white count of 7, hemoglobin 12.1 and a     platelet count of 229,000.       Patient is completely asymptomatic denies any bleeding or bruising. Patient also     denies any family history of blood disorder, she reports over 30 pack history     of smoking.She also did denies any history of jaundice or hepatitis.            1) Leukocytosis:       2) Pulmonary nodules:            [facility] - Hematology Interim      1) Leukocytosis: Ms. Abad Moses presents for 6 month follow up for chronic     leukocytosis likely secondary to chronic tobacco abuse. She has intermittent     leukocytosis dating back to 2018. She continues to smoke. Recent CBC done     on 3/15/2021 does show improvement in WBC which is normal on this day. She does     continue to smoke 1 PPD. She has tried Chantix in the past, but was unsuccessful    in her efforts. She has tried Nicotine patches as well and wants to try these     again.             She denies any fevers, chills,  joint pain.             She does report she was hospitalized in November for pyelonephritis for 3 days     in November of last year and now follows with Dr. Schneider, urology here locally.             She has not receive any COVID vaccines yet.             2) Pulmonary nodules: History of stable pulmonary nodules and low grade     mediastinal and hilar lymphadenopathy unchanged from previous CT scans. Does     show emphysema.             3) Tobacco abuse: Will use Nicotine patches for smoking cessation.             4) Hemartoma on CT scan in the right breast. Will evaluate with screening     mammogram.            Most Recent Imaging Findings      [facility]                PACS RADIOLOGY REPORT            Patient: ABAD BAILEY   Acct: #[id number]   Report: #[id number]            UNIT #: [id number]    DOS: 03/15/21 0935      INSURANCE:[employer]   ORDER #:[id number]      LOCATION:CT     : 1970            PROVIDERS      ADMITTING:     ATTENDING: SHEBA FUNEZ      FAMILY:  ARIADNA DENTON   ORDERING:  SHEBA FUNEZ         OTHER:    DICTATING:  NESHA PHAM MD            REQ #:[id number]   EXAM:[facility] - CT CHEST without CONTRAST      REASON FOR EXAM:  ABN FINDINGS OF LUNG FIELD      REASON FOR VISIT:  ABN FINDINGS OF LUNG FIELD            *******Signed******         PROCEDURE:   CT CHEST WITHOUT CONTRAST             COMPARISON:   [facility], CT, CT CHEST ABD PEL W CONTRAST,     2020, 13:08.             INDICATIONS:   RIGHT UPPER LOBE NODULE             TECHNIQUE:   CT images were created without the administration of contrast     material.               PROTOCOL:     Standard imaging protocol performed                RADIATION:     DLP: 513mGy*cm          Automated exposure control was utilized to minimize radiation dose.              FINDINGS:         Emphysema.  Stable sub cm pulmonary nodules.  No new  or enlarging pulmonary     nodules.  Mildly       prominent mediastinal lymph nodes are unchanged.  There is a 1.4 cm nodular     opacity in the mid       right breast.  A previously measured approximately 1.2 cm.             No acute findings in the included upper abdomen.  Stable low-attenuation adrenal    nodules, most in       keeping with benign adenomas.  No aggressive appearing bone change.               CONCLUSION:   Stable sub cm pulmonary nodules.  Emphysema.             Mildly prominent mediastinal lymph nodes are not significantly changed.             A right breast nodular opacity may be minimally larger.  This nodule has been     previously described       as a benign hamartoma.                NESHA PHAM MD             Electronically Signed and Approved By: NESHA PHAM MD on 3/15/2021 at 11:30                               Until signed, this is an unconfirmed preliminary report that may contain      errors and is subject to change.                                              MASON:      D:03/15/21 1131            PAST, FAMILY   Past Medical History      Other PMH:        emphysema on CT scan from 3/2021      pyelnonephritis      Other Hematology History:        leukocytosis            Past Surgical History            2 HERNIA REMOVED JULY 2019 IN [place] - DR SQUIRES            Family History            none            Social History      Lives independently:  No            Tobacco Use      Tobacco status:  Current every day smoker      Smoking packs/day:  1      Currently Vaping:  No            Alcohol Use      Alcohol intake:  None            Substance Use      Substance use:  Denies use            REVIEW OF SYSTEMS      General:  Denies: Appetite Change, Fatigue, Fever, Night Sweats, Weight Gain,     Weight Loss      Eye:  Denies Blurred Vision, Denies Corrective Lenses, Denies Diplopia, Denies     Vision Changes      ENT:  Denies Headache, Denies Hearing Loss, Denies Hoarseness, Denies Sore      Throat      Cardiovascular:  Denies Chest Pain, Denies Palpitations      Respiratory:  Denies: Cough, Coughing Blood, Productive Cough, Shortness of Air,    Wheezing      Gastrointestinal:  Denies Bloody Stools, Denies Constipation, Denies Diarrhea,     Denies Nausea/Vomiting, Denies Problem Swallowing, Denies Unable to Control     Bowels      Genitourinary:  Denies Blood in Urine, Denies Incontinence, Denies Painful     Urination      Musculoskeletal:  Denies Back Pain, Denies Muscle Pain, Denies Painful Joints      Integumentary:  Denies Itching, Denies Lesions, Denies Rash      Neurologic:  Denies Dizziness, Denies Numbness\Tingling, Denies Seizures      Psychiatric:  Denies Anxiety, Denies Depression      Endocrine:  Denies Cold Intolerance, Denies Heat Intolerance      Hematologic/Lymphatic:  Denies Bruising, Denies Bleeding, Denies Enlarged Lymph     Nodes      Reproductive:  Denies: Menopause, Heavy Periods, Pregnant, Still Menstruating            VITAL SIGNS AND SCORES      Vitals      Weight 182 lbs 8.654 oz / 82.8 kg      Temperature 97.6 F / 36.44 C - Temporal      Pulse 76      Respirations 18      Blood Pressure 124/84 Sitting, Right Arm      Pulse Oximetry 97%, RM AIR            Pain Score      Experiencing any pain?:  No      Pain Scale Used:  Numerical      Pain Intensity:  0            Fatigue Score      Experiencing any fatigue?:  No      Fatigue (0-10 scale):  0 (none)            EXAM      General appearance:  in no apparent distress, cooperative, appears stated age.      HEENT: No pallor, no icterus, oral mucosa moist      Neck: Supple, trachea central-not deviated      Lymph nodes: none palpable peripherally      Cardiovascular: S1-S2 heard, no murmurs, no rubs, no gallops.      Breast exam:      Respiratory: Clear to auscultation bilaterally, no adventitious sounds      Abdomen/gastro: Soft, nontender, no palpable hepatosplenomegaly, bowel sounds     heard      Skin: No lesions, no rashes, no  petechiae.      Extremities: No pedal edema, peripheral pulses felt, no clubbing      Musculoskeletal: Normal tone, no rigidity of muscles; range of motion intact      Spine: No deformities      Psychiatric: Alert and oriented x3, appropriate affect, intact judgment      Neurologic: Cranial nerves II through XII grossly intact, no gross neurological     deficits noted.            PREVENTION      Hx Influenza Vaccination:  Yes      Date Influenza Vaccine Given:  Oct 1, 2020      Influenza Vaccine Declined:  No      2 or More Falls in Past Year?:  No      Fall Past Year with Injury?:  No      Hx Pneumococcal Vaccination:  No      Encouraged to follow-up with:  PCP regarding preventative exams.      Chart initiated by      KEYA MUNOZ [facility]            ALLERGY/MEDS      Allergies      Coded Allergies:             PENICILLINS (Verified  Adverse Reaction, Unknown, UNSURE, 3/30/21)                  PATIENT STATES SHE DOESN'T REMEMBER WHAT KIND OF REACTION                  SHE HAD TO PCN.            Medications      Last Reconciled on 3/30/21 11:35 by SHEBA FUNEZ      Nicotine 21 Mg Patch (Nicotine 21 Mg Patch) 1 Each Patch.td24      21 MG TRANSDERM QDAY, #30 PATCH 3 Refills         Prov: SHEBA FUNEZ onc         3/30/21       Ertapenem Sodium (INVanz) 1 Gm Vial      1000 MG IM QDAY for 9 Days, #9 VIAL         Prov: Anmol Contreras         11/12/20       Cholecalciferol (Vitamin D3) (Vitamin D3) 50,000 Unit Capsule      23366 UNITS PO Q7D for 30 Days, #4 CAP         Reported         11/9/20       traZODone HCl (traZODone HCl) 100 Mg Tablet      200 MG PO HS, #60 TAB 0 Refills         Reported         11/9/20       Colestipol HCl (Colestipol HCl) 1 Gm Tablet      1 GM PO BID for 30 Days, #60 TAB         Reported         2/3/20       Omeprazole (Omeprazole*) 40 Mg Capsule      40 MG PO QDAY, #60 CAP 0 Refills         Reported         12/13/19       Sertraline HCl (Sertraline*) 50 Mg Tablet      50 MG PO HS, #30  TAB 0 Refills         Reported         12/13/19       Atorvastatin (Atorvastatin) 20 Mg Tablet      20 MG PO HS, #30 TAB 0 Refills         Reported         12/13/19       Potassium Chloride (K-Dur*) 10 Meq Tab.er.prt      10 MEQ PO QDAY PRN for SWELLING, #30 TAB 0 Refills         Reported         6/7/18       SUMAtriptan Succinate (Imitrex) 25 Mg Tablet      25 MG PO ASDIR PRN for MIGRAINE, TAB         Reported         11/8/17       Topiramate (Topamax*) 25 Mg Tablet      25 MG PO HS, TAB         Reported         9/12/17       Furosemide (Lasix) 20 Mg Tablet      10 MG PO QDAY PRN for SWELLING, #30 TAB 0 Refills         Reported         9/12/17       Loratadine (Claritin) 10 Mg Tablet      10 MG PO QDAY, #30 TAB 0 Refills         Reported         9/12/17       rOPINIRole HCl (rOPINIRole HCl) 0.5 Mg Tab      0.5 MG PO HS for 30 Days, #30 TAB         Reported         9/12/17      Medications Reviewed:  No Changes made to meds            IMPRESSION/PLAN      Impression      1) Leukocytosis: Ms. Vaishnavi Moses presents for 6 month follow up for chronic     leukocytosis likely secondary to chronic tobacco abuse. She has intermittent     leukocytosis dating back to June 2018. She continues to smoke. Recent CBC done     on 3/15/2021 does show improvement in WBC which is normal on this day. She does     continue to smoke 1 PPD. She has tried Chantix in the past, but was unsuccessful    in her efforts. She has tried Nicotine patches as well and wants to try these     again.             She denies any fevers, chills, joint pain.             She does report she was hospitalized in November for pyelonephritis for 3 days     in November of last year and now follows with Dr. Schneider, urology here locally.             She has not receive any COVID vaccines yet.             2) Pulmonary nodules: History of stable pulmonary nodules and low grade med    iastinal and hilar lymphadenopathy unchanged from previous CT scans. Does show      emphysema.             3) Tobacco abuse: Will use Nicotine patches for smoking cessation.             4) Hemartoma on CT scan in the right breast. Will evaluate with screening     mammogram.            Diagnosis      Screening mammogram, encounter for - Z12.31            Leukocytosis         Leukocytosis, unspecified type - D72.829         Leukocytosis type: unspecified            Notes      New Medications      * Nicotine 21 Mg Patch 1 EACH PATCH.TD24: 21 MG TRANSDERM QDAY #30      New Diagnostics      * Screening Mammo, SCHEDULED PROCEDURE         Dx: Screening mammogram, encounter for - Z12.31      * CBC With Auto Diff, 6 Months         Dx: Leukocytosis - D72.829            Plan      1) Recheck CBC with diff in 6 months.             2) Repeat CT in 1 year.             3) Nicotine patches sent to pharmacy for smoking cessation.             4) Screening mammogram to evaluate hemartoma of the right breast.             Follow up in 6 months with labs prior and follow up with NP in 6 months.             Call with any questions or concerns.            Pain      Pain Zero Today            Patient Education            DI for Leukocytosis      How to Quit Tobacco Products      Mammography      Nicotine Transdermal Patch      Patient Education Provided:  Yes            Electronically signed by SHEBA FUNEZ onc  03/30/2021 11:36       Disclaimer: Converted document may not contain table formatting or lab diagrams. Please see [employer] for the authenticated document.

## 2021-05-28 NOTE — PROGRESS NOTES
Patient: ABAD MOSES     Acct: [id number]     Report: #[id number]  UNIT #: [id number]     : 1970    Encounter Date:2020  PRIMARY CARE: ARIADNA DENTON  ***Signed***  --------------------------------------------------------------------------------------------------------------------  NURSE INTAKE      Visit Type      Established Patient Visit            Chief Complaint      NEUTROPHILIA            Referring Provider/Copies To      Referring Provider:  ARIADNA DENTON      Primary Care Provider:  ARIADNA DENTON      Copies To:   ARIADNA DENTON            History and Present Illness      Past History      Date:  2018      Mrs. Moses is a very pleasant 48-year-old female without any significant past    medical history. She is on several prescribed medications. She underwent     physical examination by PCP  and underwent lab workup which was remarkable for     leukocytosis. Her most recent CBC is not available but she reports a WBC count     of 17.  A past CBC on 11/15/17 shows a white count of 7, hemoglobin 12.1 and a     platelet count of 229,000.       Patient is completely asymptomatic denies any bleeding or bruising. Patient also     denies any family history of blood disorder, she reports over 30 pack history     of smoking.She also did denies any history of jaundice or hepatitis.            [facility] - Hematology Interim       Presents for follow up for leukocytosis and several chronic issues:             1) Leukocytosis: Ms. Talya Moses presents for 2 week follow-up for     leukocytosis.  She was previously seen by Dr. Markham in 2018 for     leukocytosis.  This was felt to be related to chronic tobacco use.  We are asked    to see her again for follow-up.  Her most recent white blood cell count in     2019 did show a WBC count of 11.48.  Normal platelets and normal     hemoglobin and normal differential.  On lab work completed that day her white     count was resolved  with a WBC count of 9.73.  This is likely related to chronic     tobacco use.  Patient does report that she is planning to stop smoking and has     nicotine patches available to use.             2) Pulmonary nodules: Recent CT on 2020 shows pulmonary nodules which are     stable compared to previous CT. RUL 7mm, RLL 6mm, Left lung 7mm. These are     stable in the interim. There is stable low grade mediastinal and right hilar     lymphadenopathy stable and similar to previous CT exam.             3) Colonic thickening: Recent CT did show non-specific diffuse thickening of the    descending and sigmoid colon as well as focal areas in the transverse colon and     hepatic flexure. History of chronic diarrhea. Recent stool studies were all     negative. She recently started Colestipol. It is recommend she have colonoscopy     screening.             4) Tobacco abuse: Chronic tobacco abuse of 1 PPD for many years. She has     Nicotine patches she will start for tobacco cessation.            Most Recent Lab Findings      Laboratory Tests      2/3/20 10:41            2/3/20 10:51            Most Recent Imaging Findings      [facility]                PACS RADIOLOGY REPORT            Patient: ABAD BAILEY   Acct: #[id number]   Report: #[id number]            UNIT #: [id number]    DOS: 20 1256      INSURANCE:[employer]   ORDER #:[id number]      LOCATION:CT     : 1970            PROVIDERS      ADMITTING:     ATTENDING: SHEBA FUNEZ      FAMILY:  ARIADNA DENTON   ORDERING:  SHEBA FUNEZ         OTHER:    DICTATING:  CAMRON MARTINEZ MD            REQ #:[id number]   EXAM:CTACPWC - CT ABD CHEST PEL w CONTR      REASON FOR EXAM:  DIS OF ADRENAL GLAND ABN FINDING LUNG      REASON FOR VISIT:  DIS OF ADRENAL GLAND ABN FINDING LUNG            *******Signed******         PROCEDURE:   CT ABDOMEN; CT CHEST; CT PELVIS WITH  CONTRAST             COMPARISON:   [facility], CT, CHEST W/ CONTRAST, 6/13/2018, 11:20.             INDICATIONS:   DIS OF ADRENAL GLAND ABN FINDING LUNG, diarrhea, nausea and     vomiting.  History of       bilateral adrenal nodules.  History of bilateral pulmonary nodules.             TECHNIQUE:   After obtaining the patient's consent, CT images were obtained with    intravenous contrast       material.      PROTOCOL:     Standard imaging protocol performed                RADIATION:     DLP: 1725 mGy*cm          Automated exposure control was utilized to minimize radiation dose.       CONTRAST:   100cc Isovue 370 I.V.      LABS:     eGFR: >60ml/min/1.73m2             FINDINGS:             Chest:                There is a stable 7 millimeter subpleural nodule in the right upper lobe seen     best on image number       20 of the axial series on today's examination.  There is an additional 6     millimeter nodule in the       posterior subpleural right lower lobe seen best on image number 50 of the axial     series.  This is       also stable.  There is some subpleural scarring again noted the posterior right     lower lobe.  There       is a stable 7 millimeter noncalcified nodule in the left lung adjacent to the     major fissure.  No       other pulmonary nodules masses are identified.  There is bilateral upper lung     predominant moderate       emphysematous change and apical scarring.  Bibasilar atelectasis.  Lungs are     otherwise clear.  No       supraclavicular or axillary adenopathy is seen.  The mildly prominent lymph     nodes are again noted       in the right paratracheal region and right hilar region.  These appear similar     to the prior study.             There is a small hiatal hernia.  The central airways appear patent.             Abdomen and pelvis:             In the upper abdomen, the spleen pancreas and liver have a grossly normal     appearance.  The       gallbladder is  surgically absent.  3 centimeter right and 1.7 centimeter left     low-density adrenal       nodules again noted most likely secondary to adenomata.  These appear stable.      There or probable       bilateral renal cysts are noted.  There is a somewhat lobular cortical margin to    the kidneys       bilaterally.  The kidneys excrete symmetrically.  No gross collecting system     filling defects are       evident within the opacified portions.             There is no evidence of small-bowel obstruction, free air or free fluid.             There is scattered colonic diverticulosis without findings to suggest     diverticulitis.  The appendix       is absent.  Terminal ileum is grossly unremarkable.  There is nonspecific wall     thickening involving       the descending and sigmoid colon.  There are additional focal segments of wall     thickening which may       be due to contraction in the transverse colon and hepatic flexure.  Correlate     clinically for       colitis.  This could be more definitively evaluated by colonoscopy.  The patient    is status post       hysterectomy.  There is scattered calcified atherosclerotic change throughout     the abdominal aorta       and branches.  There is a small fat containing left inguinal hernia.  There is     normal opacification       of the SMA, celiac and mesenteric veins within the visualized portions.  The     splenic and portal       veins appear normally opacified.  No adenopathy is seen in the abdomen or     pelvis.               There is degenerative change in the spine, worst at L5-S1.  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               CONCLUSION:                1. Stable subcentimeter bilateral pulmonary nodules as compared to prior study     of 06/13/2018.               2. Low-grade mediastinal and right hilar lymphadenopathy appears similar to avinash    or study as well.             3.  Stable bilateral low-density adrenal nodules, probable adenomata given the st    ability.             4. Nonspecific diffuse thickening of the descending and sigmoid colon as well as    focal areas of       thickening in the transverse colon and at the hepatic flexure.  This may be due     to colitis, but is       nonspecific.  This could be more definitively evaluated by colonoscopy.             5. Colonic diverticulosis without findings to suggest diverticulitis.             Additional findings as given above.                CAMRON MARTINEZ MD             Electronically Signed and Approved By: CAMRON MARTINEZ MD on 2/07/2020 at 13:51            PAST, FAMILY   Past Surgical History            2 HERNIA REMOVED JULY 2019 IN [facility] AREA - DR SQUIRES            Social History      Lives independently:  No            Tobacco Use      Tobacco status:  Light tobacco smoker      Smoking packs/day:  0.5      Quit status:  Considering quitting            Alcohol Use      Alcohol intake:  None            Substance Use      Substance use:  Denies use            REVIEW OF SYSTEMS      General:  Admits: Fatigue;          Denies: Appetite Change, Fever, Night Sweats, Weight Gain, Weight Loss      Eye:  Denies Blurred Vision, Denies Corrective Lenses, Denies Diplopia, Denies     Vision Changes      ENT:  Denies Headache, Denies Hearing Loss, Denies Hoarseness, Denies Sore Thro    at      Cardiovascular:  Denies Chest Pain, Denies Palpitations      Respiratory:  Denies: Coughing Blood, Productive Cough, Shortness of Air,     Wheezing      Gastrointestinal:  Admits Nausea/Vomiting (NAUSEA); Denies Bloody Stools, Denies    Constipation, Denies Diarrhea, Denies Problem Swallowing, Denies Unable to     Control Bowels      Genitourinary:  Denies Blood in Urine, Denies Incontinence, Denies Painful     Urination      Musculoskeletal:  Denies Back Pain, Denies Muscle Pain, Denies Painful Joints      Integumentary:  Denies Itching, Denies Lesions,  Denies Rash      Neurologic:  Denies Dizziness, Denies Numbness\Tingling, Denies Seizures      Psychiatric:  Denies Anxiety, Denies Depression      Endocrine:  Denies Cold Intolerance, Denies Heat Intolerance      Hematologic/Lymphatic:  Denies Bruising, Denies Bleeding, Denies Enlarged Lymph     Nodes      Reproductive:  Denies: Menopause, Heavy Periods, Pregnant, Still Menstruating            VITAL SIGNS AND SCORES      Vitals      Height 5 ft 4.17 in / 163 cm      Weight 175 lbs 7.779 oz / 79.6 kg      BSA 1.85 m2      BMI 30.0 kg/m2      Temperature 98.0 F / 36.67 C - Temporal      Pulse 90      Respirations 18      Blood Pressure 117/83 Sitting, Left Arm      Pulse Oximetry 100%, ROOM AIR            Pain Score      Experiencing any pain?:  No      Pain Scale Used:  Numerical      Pain Intensity:  0            Fatigue Score      Experiencing any fatigue?:  Yes      Fatigue (0-10 scale):  7            EXAM      General appearance:  in no apparent distress, cooperative, appears stated age.      HEENT: No pallor, no icterus, oral mucosa moist      Neck: Supple, trachea central-not deviated      Lymph nodes: none palpable peripherally      Cardiovascular: S1-S2 heard, no murmurs, no rubs, no gallops.      Breast exam:      Respiratory: Clear to auscultation bilaterally, no adventitious sounds      Abdomen/gastro: Soft, nontender, no palpable hepatosplenomegaly, bowel sounds     heard      Skin: No lesions, no rashes, no petechiae.      Extremities: No pedal edema, peripheral pulses felt, no clubbing      Musculoskeletal: Normal tone, no rigidity of muscles; range of motion intact      Spine: No deformities      Psychiatric: Alert and oriented x3, appropriate affect, intact judgment      Neurologic: Cranial nerves II through XII grossly intact, no gross neurological     deficits noted.            PREVENTION      Hx Influenza Vaccination:  Yes      Date Influenza Vaccine Given:  Oct 1, 2019      Influenza Vaccine  Declined:  No      2 or More Falls Past Year?:  Yes      Fall Past Year with Injury?:  No      Hx Pneumococcal Vaccination:  No      Encouraged to follow-up with:  PCP regarding preventative exams.      Chart initiated by      JASON WU MA            ALLERGY/MEDS      Allergies      Coded Allergies:             PENICILLINS (Verified  Adverse Reaction, Unknown, UNSURE, 2/17/20)                  PATIENT STATES SHE DOESN'T REMEMBER WHAT KIND OF REACTION                  SHE HAD TO PCN.            Medications      Last Reconciled on 2/17/20 11:22 by SHEBA FUNEZ      Colestipol HCl (Colestipol HCl) 1 Gm Tablet      1 GM PO BID for 30 Days, #60 TAB         Reported         2/3/20       Omeprazole (Omeprazole*) 40 Mg Capsule      40 MG PO BID, #60 CAP 0 Refills         Reported         12/13/19       Sertraline HCl (Sertraline*) 50 Mg Tablet      50 MG PO HS, #30 TAB 0 Refills         Reported         12/13/19       Atorvastatin (Atorvastatin) 20 Mg Tablet      20 MG PO HS, #30 TAB 0 Refills         Reported         12/13/19       Oxybutynin Chloride ER (Oxybutynin Chloride ER) 15 Mg Tab.er.24      5 MG PO QDAY, TAB         Reported         12/13/19       Ondansetron Odt (ONDANSETRON ODT) 4 Mg Tab.rapdis      4 MG PO Q4H PRN for NAUSEA, #10 TAB.RAPDIS 0 Refills         Prov: Lamont Amador         7/11/19       Potassium Chloride (K-Dur*) 10 Meq Tab.er.prt      10 MEQ PO QDAY PRN for SWELLING, #30 TAB 0 Refills         Reported         6/7/18       SUMAtriptan Succinate (Imitrex) 25 Mg Tablet      25 MG PO ASDIR PRN for MIGRAINE, TAB         Reported         11/8/17       Topiramate (Topamax*) 25 Mg Tablet      25 MG PO HS, TAB         Reported         9/12/17       Furosemide* (Lasix*) 20 Mg Tablet      20 MG PO QDAY PRN for SWELLING, #30 TAB 0 Refills         Reported         9/12/17       Loratadine (Claritin) 10 Mg Tablet      10 MG PO QDAY, #30 TAB 0 Refills         Reported         9/12/17        rOPINIRole HCl (rOPINIRole HCl) 0.5 Mg Tab      0.5 MG PO HS for 30 Days, #30 TAB         Reported         9/12/17       traZODone HCl (Desyrel) 150 Mg Tablet      150 MG PO HS, #30 TAB 0 Refills         Reported         1/7/16      Medications Reviewed:  No Changes made to meds            IMPRESSION/PLAN      Impression      1) Leukocytosis: Ms. Talya Moses presents for 2 week follow-up for     leukocytosis.  She was previously seen by Dr. Markham in June 2018 for leukocyto    sis.  This was felt to be related to chronic tobacco use.  We are asked to see     her again for follow-up.  Her most recent white blood cell count in November 2019 did show a WBC count of 11.48.  Normal platelets and normal hemoglobin and     normal differential.  On lab work completed that day her white count was     resolved with a WBC count of 9.73.  This is likely related to chronic tobacco     use.  Patient does report that she is planning to stop smoking and has nicotine     patches available to use.             2) Pulmonary nodules: Recent CT on 2/7/2020 shows pulmonary nodules which are     stable compared to previous CT. RUL 7mm, RLL 6mm, Left lung 7mm. These are     stable in the interim. There is stable low grade mediastinal and right hilar     lymphadenopathy stable and similar to previous CT exam.             3) Colonic thickening: Recent CT did show non-specific diffuse thickening of the    descending and sigmoid colon as well as focal areas in the transverse colon and     hepatic flexure. History of chronic diarrhea. Recent stool studies were all     negative. She recently started Colestipol. It is recommend she have colonoscopy     screening.             4) Tobacco abuse: Chronic tobacco abuse of 1 PPD for many years. She has     Nicotine patches she will start for tobacco cessation.            Diagnosis      Leukocytopenia, unspecified - D72.819            Diverticulosis - K57.90            Colon wall thickening -  K63.9            Notes      New Diagnostics      * CBC With Auto Diff, 6 Months         Dx: Leukocytopenia, unspecified - D72.819      New Referrals      * Provider Consult, As Soon As Possible         sarkis         Dx: Diverticulosis - K57.90            Plan      1) Repeat CBC in 6 months. Recommend tobacco cessation. Follow up in 6 months 1     week after lab draw with NP.             2) She will need a yearly CT screening to evaluate the pulmonary nodules.             3) Follow up with Dr. Amador for colonoscopy screening for colonic thickening.     Stool studies were all negative.             4) Has available Nicotine patches available for smoking cessation.            Pain      Pain Zero Today            Advanced Care Plan Discussion      Declines Discussion [id number]            Patient Education            Colonoscopy      DI for Pulmonary Nodule      Patient Education Provided:  Yes            Electronically signed by SHEBA FUNEZ onc  02/17/2020 11:22       Disclaimer: Converted document may not contain table formatting or lab diagrams. Please see [employer] for the authenticated document.

## 2021-07-13 PROBLEM — M79.89 LIMB SWELLING: Status: ACTIVE | Noted: 2021-07-13

## 2021-07-13 PROBLEM — M79.609 LIMB PAIN: Status: ACTIVE | Noted: 2021-07-13

## 2021-07-13 PROBLEM — M54.50 LOW BACK PAIN: Status: ACTIVE | Noted: 2021-07-13

## 2021-07-13 PROBLEM — N39.0 RECURRENT UTI: Status: ACTIVE | Noted: 2021-07-13

## 2021-07-13 PROBLEM — E78.00 HIGH CHOLESTEROL: Status: ACTIVE | Noted: 2021-07-13

## 2021-07-13 PROBLEM — IMO0002 ULCERATIVE LESION: Status: ACTIVE | Noted: 2021-07-13

## 2021-07-13 PROBLEM — N95.2 VAGINAL ATROPHY: Status: ACTIVE | Noted: 2021-07-13

## 2021-07-27 DIAGNOSIS — D72.829 LEUKOCYTOSIS, UNSPECIFIED TYPE: Primary | ICD-10-CM

## 2021-07-28 DIAGNOSIS — N95.2 VAGINAL ATROPHY: Primary | ICD-10-CM

## 2021-07-28 RX ORDER — NICOTINE 21 MG/24HR
PATCH, TRANSDERMAL 24 HOURS TRANSDERMAL
COMMUNITY
Start: 2021-05-20

## 2021-07-28 RX ORDER — OMEPRAZOLE 40 MG/1
40 CAPSULE, DELAYED RELEASE ORAL DAILY
COMMUNITY
Start: 2021-06-29

## 2021-07-28 RX ORDER — PROMETHAZINE HYDROCHLORIDE 25 MG/1
25 TABLET ORAL 4 TIMES DAILY PRN
COMMUNITY
Start: 2021-07-16 | End: 2022-06-19

## 2021-07-28 RX ORDER — ROPINIROLE 1 MG/1
TABLET, FILM COATED ORAL
COMMUNITY
Start: 2021-07-24 | End: 2022-04-04

## 2021-07-28 RX ORDER — OXYBUTYNIN CHLORIDE 5 MG/1
5 TABLET, EXTENDED RELEASE ORAL DAILY
COMMUNITY
Start: 2021-06-29

## 2021-07-28 RX ORDER — ESTRADIOL 0.1 MG/G
CREAM VAGINAL
Qty: 42.5 G | Refills: 2 | Status: SHIPPED | OUTPATIENT
Start: 2021-07-28 | End: 2021-07-29 | Stop reason: SDUPTHER

## 2021-07-29 DIAGNOSIS — N95.2 VAGINAL ATROPHY: ICD-10-CM

## 2021-07-29 RX ORDER — ESTRADIOL 0.1 MG/G
1 CREAM VAGINAL 2 TIMES WEEKLY
Qty: 42.5 G | Refills: 2 | Status: SHIPPED | OUTPATIENT
Start: 2021-07-29 | End: 2021-09-23

## 2021-09-23 DIAGNOSIS — N95.2 VAGINAL ATROPHY: ICD-10-CM

## 2021-09-23 RX ORDER — ESTRADIOL 0.1 MG/G
CREAM VAGINAL
Qty: 42.5 G | Refills: 2 | Status: SHIPPED | OUTPATIENT
Start: 2021-09-23 | End: 2021-12-27

## 2021-09-30 ENCOUNTER — APPOINTMENT (OUTPATIENT)
Dept: ONCOLOGY | Facility: HOSPITAL | Age: 51
End: 2021-09-30

## 2021-12-04 ENCOUNTER — HOSPITAL ENCOUNTER (EMERGENCY)
Facility: HOSPITAL | Age: 51
Discharge: HOME | End: 2021-12-04
Attending: EMERGENCY MEDICINE | Admitting: EMERGENCY MEDICINE

## 2021-12-04 ENCOUNTER — APPOINTMENT (OUTPATIENT)
Dept: GENERAL RADIOLOGY | Facility: HOSPITAL | Age: 51
End: 2021-12-04

## 2021-12-04 VITALS
OXYGEN SATURATION: 94 % | SYSTOLIC BLOOD PRESSURE: 97 MMHG | HEART RATE: 82 BPM | BODY MASS INDEX: 28.57 KG/M2 | DIASTOLIC BLOOD PRESSURE: 74 MMHG | RESPIRATION RATE: 18 BRPM | WEIGHT: 167.33 LBS | HEIGHT: 64 IN | TEMPERATURE: 97.9 F

## 2021-12-04 DIAGNOSIS — F10.220 ALCOHOL DEPENDENCE WITH UNCOMPLICATED INTOXICATION (HCC): Primary | ICD-10-CM

## 2021-12-04 LAB
ALBUMIN SERPL-MCNC: 3.6 G/DL (ref 3.5–5.2)
ALBUMIN/GLOB SERPL: 1.2 G/DL
ALP SERPL-CCNC: 100 U/L (ref 39–117)
ALT SERPL W P-5'-P-CCNC: 15 U/L (ref 1–33)
AMMONIA BLD-SCNC: 34 UMOL/L (ref 11–51)
ANION GAP SERPL CALCULATED.3IONS-SCNC: 11.6 MMOL/L (ref 5–15)
AST SERPL-CCNC: 19 U/L (ref 1–32)
BASOPHILS # BLD AUTO: 0.05 10*3/MM3 (ref 0–0.2)
BASOPHILS NFR BLD AUTO: 0.6 % (ref 0–1.5)
BILIRUB SERPL-MCNC: 0.2 MG/DL (ref 0–1.2)
BUN SERPL-MCNC: 8 MG/DL (ref 6–20)
BUN/CREAT SERPL: 9.9 (ref 7–25)
C TRACH RRNA CVX QL NAA+PROBE: NOT DETECTED
CALCIUM SPEC-SCNC: 8.3 MG/DL (ref 8.6–10.5)
CHLORIDE SERPL-SCNC: 105 MMOL/L (ref 98–107)
CK MB SERPL-CCNC: 2.63 NG/ML
CK SERPL-CCNC: 66 U/L (ref 20–180)
CO2 SERPL-SCNC: 24.4 MMOL/L (ref 22–29)
CREAT SERPL-MCNC: 0.81 MG/DL (ref 0.57–1)
D-LACTATE SERPL-SCNC: 2.2 MMOL/L (ref 0.5–2)
DEPRECATED RDW RBC AUTO: 50.5 FL (ref 37–54)
EOSINOPHIL # BLD AUTO: 0.07 10*3/MM3 (ref 0–0.4)
EOSINOPHIL NFR BLD AUTO: 0.8 % (ref 0.3–6.2)
ERYTHROCYTE [DISTWIDTH] IN BLOOD BY AUTOMATED COUNT: 13.8 % (ref 12.3–15.4)
ETHANOL BLD-MCNC: 280 MG/DL (ref 0–10)
ETHANOL UR QL: 0.28 %
GFR SERPL CREATININE-BSD FRML MDRD: 75 ML/MIN/1.73
GLOBULIN UR ELPH-MCNC: 3 GM/DL
GLUCOSE SERPL-MCNC: 84 MG/DL (ref 65–99)
HCT VFR BLD AUTO: 43.8 % (ref 34–46.6)
HGB BLD-MCNC: 14.8 G/DL (ref 12–15.9)
HOLD SPECIMEN: NORMAL
IMM GRANULOCYTES # BLD AUTO: 0.03 10*3/MM3 (ref 0–0.05)
IMM GRANULOCYTES NFR BLD AUTO: 0.4 % (ref 0–0.5)
INR PPP: 1.08 (ref 2–3)
LIPASE SERPL-CCNC: 49 U/L (ref 13–60)
LYMPHOCYTES # BLD AUTO: 2.59 10*3/MM3 (ref 0.7–3.1)
LYMPHOCYTES NFR BLD AUTO: 30.2 % (ref 19.6–45.3)
MAGNESIUM SERPL-MCNC: 1.7 MG/DL (ref 1.6–2.6)
MCH RBC QN AUTO: 33.5 PG (ref 26.6–33)
MCHC RBC AUTO-ENTMCNC: 33.8 G/DL (ref 31.5–35.7)
MCV RBC AUTO: 99.1 FL (ref 79–97)
MONOCYTES # BLD AUTO: 0.61 10*3/MM3 (ref 0.1–0.9)
MONOCYTES NFR BLD AUTO: 7.1 % (ref 5–12)
N GONORRHOEA RRNA SPEC QL NAA+PROBE: NOT DETECTED
NEUTROPHILS NFR BLD AUTO: 5.22 10*3/MM3 (ref 1.7–7)
NEUTROPHILS NFR BLD AUTO: 60.9 % (ref 42.7–76)
NRBC BLD AUTO-RTO: 0 /100 WBC (ref 0–0.2)
NT-PROBNP SERPL-MCNC: 22.3 PG/ML (ref 0–450)
PLATELET # BLD AUTO: 218 10*3/MM3 (ref 140–450)
PMV BLD AUTO: 9.9 FL (ref 6–12)
POTASSIUM SERPL-SCNC: 3.8 MMOL/L (ref 3.5–5.2)
PROT SERPL-MCNC: 6.6 G/DL (ref 6–8.5)
PROTHROMBIN TIME: 11.2 SECONDS (ref 9.4–12)
QT INTERVAL: 385 MS
QT INTERVAL: 404 MS
RBC # BLD AUTO: 4.42 10*6/MM3 (ref 3.77–5.28)
SODIUM SERPL-SCNC: 141 MMOL/L (ref 136–145)
TROPONIN I SERPL-MCNC: 0 NG/ML (ref 0–0.6)
TROPONIN I SERPL-MCNC: 0 NG/ML (ref 0–0.6)
WBC NRBC COR # BLD: 8.57 10*3/MM3 (ref 3.4–10.8)
WHOLE BLOOD HOLD SPECIMEN: NORMAL
WHOLE BLOOD HOLD SPECIMEN: NORMAL

## 2021-12-04 PROCEDURE — 82140 ASSAY OF AMMONIA: CPT | Performed by: EMERGENCY MEDICINE

## 2021-12-04 PROCEDURE — 83605 ASSAY OF LACTIC ACID: CPT | Performed by: EMERGENCY MEDICINE

## 2021-12-04 PROCEDURE — 80053 COMPREHEN METABOLIC PANEL: CPT

## 2021-12-04 PROCEDURE — 93005 ELECTROCARDIOGRAM TRACING: CPT | Performed by: EMERGENCY MEDICINE

## 2021-12-04 PROCEDURE — 82550 ASSAY OF CK (CPK): CPT

## 2021-12-04 PROCEDURE — 96374 THER/PROPH/DIAG INJ IV PUSH: CPT

## 2021-12-04 PROCEDURE — 84484 ASSAY OF TROPONIN QUANT: CPT

## 2021-12-04 PROCEDURE — 83690 ASSAY OF LIPASE: CPT

## 2021-12-04 PROCEDURE — 99284 EMERGENCY DEPT VISIT MOD MDM: CPT

## 2021-12-04 PROCEDURE — 85610 PROTHROMBIN TIME: CPT | Performed by: EMERGENCY MEDICINE

## 2021-12-04 PROCEDURE — 83735 ASSAY OF MAGNESIUM: CPT

## 2021-12-04 PROCEDURE — 93005 ELECTROCARDIOGRAM TRACING: CPT

## 2021-12-04 PROCEDURE — 71045 X-RAY EXAM CHEST 1 VIEW: CPT

## 2021-12-04 PROCEDURE — 36415 COLL VENOUS BLD VENIPUNCTURE: CPT

## 2021-12-04 PROCEDURE — 85025 COMPLETE CBC W/AUTO DIFF WBC: CPT

## 2021-12-04 PROCEDURE — 87491 CHLMYD TRACH DNA AMP PROBE: CPT | Performed by: EMERGENCY MEDICINE

## 2021-12-04 PROCEDURE — 87591 N.GONORRHOEAE DNA AMP PROB: CPT | Performed by: EMERGENCY MEDICINE

## 2021-12-04 PROCEDURE — 25010000002 LORAZEPAM PER 2 MG: Performed by: EMERGENCY MEDICINE

## 2021-12-04 PROCEDURE — 82077 ASSAY SPEC XCP UR&BREATH IA: CPT | Performed by: EMERGENCY MEDICINE

## 2021-12-04 PROCEDURE — 82553 CREATINE MB FRACTION: CPT

## 2021-12-04 PROCEDURE — 83880 ASSAY OF NATRIURETIC PEPTIDE: CPT

## 2021-12-04 PROCEDURE — 93010 ELECTROCARDIOGRAM REPORT: CPT | Performed by: INTERNAL MEDICINE

## 2021-12-04 RX ORDER — ASPIRIN 81 MG/1
324 TABLET, CHEWABLE ORAL ONCE
Status: DISCONTINUED | OUTPATIENT
Start: 2021-12-04 | End: 2021-12-04 | Stop reason: HOSPADM

## 2021-12-04 RX ORDER — CHLORDIAZEPOXIDE HYDROCHLORIDE 25 MG/1
25 CAPSULE, GELATIN COATED ORAL 4 TIMES DAILY PRN
Qty: 20 CAPSULE | Refills: 0 | Status: SHIPPED | OUTPATIENT
Start: 2021-12-04

## 2021-12-04 RX ORDER — CHLORDIAZEPOXIDE HYDROCHLORIDE 25 MG/1
25 CAPSULE, GELATIN COATED ORAL 4 TIMES DAILY PRN
Qty: 20 CAPSULE | Refills: 0 | Status: SHIPPED | OUTPATIENT
Start: 2021-12-04 | End: 2021-12-04 | Stop reason: SDUPTHER

## 2021-12-04 RX ORDER — LORAZEPAM 2 MG/ML
1 INJECTION INTRAMUSCULAR ONCE
Status: COMPLETED | OUTPATIENT
Start: 2021-12-04 | End: 2021-12-04

## 2021-12-04 RX ORDER — CHLORDIAZEPOXIDE HYDROCHLORIDE 25 MG/1
50 CAPSULE, GELATIN COATED ORAL ONCE
Status: COMPLETED | OUTPATIENT
Start: 2021-12-04 | End: 2021-12-04

## 2021-12-04 RX ORDER — SODIUM CHLORIDE 0.9 % (FLUSH) 0.9 %
10 SYRINGE (ML) INJECTION AS NEEDED
Status: DISCONTINUED | OUTPATIENT
Start: 2021-12-04 | End: 2021-12-04 | Stop reason: HOSPADM

## 2021-12-04 RX ADMIN — LORAZEPAM 1 MG: 2 INJECTION INTRAMUSCULAR; INTRAVENOUS at 17:52

## 2021-12-04 RX ADMIN — SODIUM CHLORIDE 2000 ML: 9 INJECTION, SOLUTION INTRAVENOUS at 17:53

## 2021-12-04 RX ADMIN — CHLORDIAZEPOXIDE HYDROCHLORIDE 50 MG: 25 CAPSULE ORAL at 17:53

## 2021-12-04 NOTE — ED PROVIDER NOTES
Time: 5:21 PM EST  Arrived by: private car; accompanied by spouse   Chief Complaint: ANXIETY   History provided by: pt and family   History is limited by: N/A     History of Present Illness:  Patient is a 51 y.o. female that presents to the emergency department with increased ANXIETY. This has been ongoing for several weeks and is worsening. Per spouse, pt has been having panic attacks. The symptoms are moderate in severity. No modifying factors reported.     Pt c/o chills, SOB, CP (tightness), nausea, emesis, diarrhea, and headaches. Spouse also adds that pt has been having hallucinations and c/o leg cramps.     Spouse states that pt missed her last month shot of vivitrol. Her last shot was in October. Pt reports that she had a shot of ETOH this morning. Pt adds that she has been going to [facility] because of fluctuating WBC.       History provided by:  Patient and spouse      Recently seen: not recently seen in this ED     Patient Care Team  Primary Care Provider: Anais Ashraf APRN    Past Medical History:     Allergies   Allergen Reactions   • Penicillins Unknown - High Severity     Past Medical History:   Diagnosis Date   • Condition not found     Ulcer   • Degeneration of lumbosacral intervertebral disc 04/28/2015   • High cholesterol    • Limb pain    • Limb swelling    • Pain, lumbar region    • Radiculopathy, lumbosacral region 04/28/2015   • Spondylisthesis 04/28/2015    lumbar L5/S1     Past Surgical History:   Procedure Laterality Date   • ABDOMINAL HYSTERECTOMY     • APPENDECTOMY     • CHOLECYSTECTOMY     • HERNIA REPAIR       Family History   Problem Relation Age of Onset   • Heart disease Father    • Diabetes Father    • Heart disease Brother        Home Medications:  Prior to Admission medications    Medication Sig Start Date End Date Taking? Authorizing Provider   atorvastatin (Lipitor) 20 MG tablet     ProviderTex MD   colestipol (COLESTID) 1 g tablet colestipol 1 gram oral tablet  take 1 tablet (1 gram) by oral route 2 times per day swallowing whole with any liquid. Do not crush, chew and/or divide.   Active    Tex Kirkland MD   ergocalciferol (ERGOCALCIFEROL) 1.25 MG (82876 UT) capsule Vitamin D2 1,250 mcg (50,000 unit) oral capsule take 1 capsule by oral route once per week   Active    Tex Kirkland MD   escitalopram (Lexapro) 20 MG tablet Lexapro 20 mg oral tablet take 1 tablet (20 mg) by oral route once daily   Suspended    Tex Kirkland MD   estradiol (ESTRACE) 0.1 MG/GM vaginal cream APPLY A PEA SIZED AMOUNT VAGINALLY ONCE DAILY x3 WEEKS, THEN TWICE A WEEK x2 WEEKS, THEN ONCE A WEEK FOR A MONTH 9/23/21   Ofelia Betts MD   furosemide (Lasix) 20 MG tablet     Tex Kirkland MD   gabapentin (Neurontin) 100 MG capsule Neurontin 100 mg oral capsule take 1 capsule by oral route 2 times a day   Suspended    Tex Kirkland MD   loratadine (Claritin) 10 MG tablet Claritin 10 mg oral tablet take 1 tablet (10 mg) by oral route once daily   Active    Tex Kirkland MD   multivitamin (THERAGRAN) tablet tablet     Tex Kirkland MD   Naproxen Sodium (Aleve) 220 MG capsule Aleve 220 mg oral capsule take 2 capsules by oral route daily   Suspended    Tex Kirkland MD   nicotine (NICODERM CQ) 21 MG/24HR patch apply 1 patch to THE SKIN EVERY DAY 5/20/21   Tex Kirkland MD   omeprazole (PrilOSEC) 20 MG capsule Prilosec 20 mg oral capsule,delayed release(DR/EC) take 1 capsule (20 mg) by oral route once daily before a meal   Suspended    Tex Kirkland MD   omeprazole (priLOSEC) 40 MG capsule Take 40 mg by mouth Daily. 6/29/21   Tex Kirkland MD   oxybutynin (DITROPAN) 5 MG tablet oxybutynin chloride 5 mg oral tablet take 1 tablet (5 mg) by oral route 3 times per day   Active    Tex Kirkland MD   oxybutynin XL (DITROPAN-XL) 5 MG 24 hr tablet Take 5 mg by mouth Daily. 6/29/21   Tex Kirkland MD  "  oxyCODONE-acetaminophen (PERCOCET) 7.5-325 MG per tablet     Tex Kirkland MD   POTASSIUM PO     Tex Kirkland MD   promethazine (PHENERGAN) 25 MG tablet Take 25 mg by mouth 4 (Four) Times a Day As Needed. 7/16/21   Tex Kirkland MD   rOPINIRole (Requip) 0.5 MG tablet     Tex Kirkland MD   rOPINIRole (REQUIP) 1 MG tablet  7/24/21   Tex Kirkland MD   sertraline (Zoloft) 50 MG tablet Zoloft 50 mg oral tablet take 1 tablet (50 mg) by oral route once daily   Active    Tex Kirkland MD   SUMAtriptan (Imitrex) 25 MG tablet     Tex Kirkland MD   topiramate (Topamax) 25 MG tablet     Tex Kirkland MD   traMADol (ULTRAM) 50 MG tablet tramadol 50 mg oral tablet take 1 tablet by oral route 2 times a day   Suspended    Tex Kirkland MD   traZODone (DESYREL) 100 MG tablet     Tex Kirkland MD   traZODone (DESYREL) 50 MG tablet trazodone 50 mg oral tablet take 1 tablet by oral route daily   Suspended    Tex Kirkland MD        Social History:   Social History     Tobacco Use   • Smoking status: Current Every Day Smoker     Packs/day: 1.00   • Smokeless tobacco: Not on file   • Tobacco comment: smoked 21-30 years   Substance Use Topics   • Alcohol use: Never   • Drug use: Never     Recent travel: no     Review of Systems:  Review of Systems   Constitutional: Positive for chills. Negative for diaphoresis and fever.   HENT: Negative for ear discharge and nosebleeds.    Eyes: Negative for photophobia.   Respiratory: Positive for shortness of breath.    Cardiovascular: Positive for chest pain.   Gastrointestinal: Positive for diarrhea, nausea and vomiting.   Genitourinary: Negative for dysuria.   Musculoskeletal: Negative for back pain and neck pain.   Skin: Negative for rash.   Neurological: Positive for headaches.        Physical Exam:  BP 97/74   Pulse 82   Temp 97.9 °F (36.6 °C) (Oral)   Resp 18   Ht 162.6 cm (64\")   Wt 75.9 kg (167 " lb 5.3 oz)   SpO2 94%   Breastfeeding No   BMI 28.72 kg/m²     Physical Exam  Vitals and nursing note reviewed.   Constitutional:       General: She is not in acute distress.     Appearance: Normal appearance.   HENT:      Head: Normocephalic and atraumatic.      Nose: Nose normal.   Eyes:      General: No scleral icterus.  Cardiovascular:      Rate and Rhythm: Normal rate and regular rhythm.      Heart sounds: Normal heart sounds.   Pulmonary:      Effort: Pulmonary effort is normal. No respiratory distress.      Breath sounds: Normal breath sounds.   Abdominal:      Palpations: Abdomen is soft.      Tenderness: There is no abdominal tenderness.   Musculoskeletal:         General: Normal range of motion.      Cervical back: Neck supple.      Right lower leg: No edema.      Left lower leg: No edema.   Skin:     General: Skin is warm and dry.   Neurological:      General: No focal deficit present.      Mental Status: She is alert and oriented to person, place, and time.      Sensory: No sensory deficit.      Motor: No weakness.   Psychiatric:         Mood and Affect: Mood is anxious.      Comments: Somewhat irritated.               Medications in the Emergency Department:  Medications   sodium chloride 0.9 % bolus 2,000 mL (0 mL Intravenous Stopped 12/4/21 2040)   LORazepam (ATIVAN) injection 1 mg (1 mg Intravenous Given 12/4/21 1752)   chlordiazePOXIDE (LIBRIUM) capsule 50 mg (50 mg Oral Given 12/4/21 1753)        Labs    Labs Reviewed   COMPREHENSIVE METABOLIC PANEL - Abnormal; Notable for the following components:       Result Value    Calcium 8.3 (*)     All other components within normal limits    Narrative:     GFR Normal >60  Chronic Kidney Disease <60  Kidney Failure <15     CBC WITH AUTO DIFFERENTIAL - Abnormal; Notable for the following components:    MCV 99.1 (*)     MCH 33.5 (*)     All other components within normal limits   LACTIC ACID, PLASMA - Abnormal; Notable for the following components:     Lactate 2.2 (*)     All other components within normal limits   ETHANOL - Abnormal; Notable for the following components:    Ethanol 280 (*)     All other components within normal limits    Narrative:     Ethanol (Plasma)  <10 Essentially Negative    Toxic Concentrations           mg/dL    Flushing, slowing of reflexes    Impaired visual activity         Depression of CNS              >100  Possible Coma                  >300      PROTIME-INR - Abnormal; Notable for the following components:    INR 1.08 (*)     All other components within normal limits    Narrative:     Suggested Therapeutic Ranges For Oral Anticoagulant Therapy:  Level of Therapy                      INR Target Range  Standard Dose                            2.0-3.0  High Dose                                2.5-3.5  Patients not receiving anticoagulant  Therapy Normal Range                     0.6-1.2   CHLAMYDIA TRACHOMATIS, NEISSERIA GONORRHOEAE, PCR - Normal   LIPASE - Normal   BNP (IN-HOUSE) - Normal    Narrative:     Among patients with dyspnea, NT-proBNP is highly sensitive for the detection of acute congestive heart failure. In addition NT-proBNP of <300 pg/ml effectively rules out acute congestive heart failure with 99% negative predictive value.    Results may be falsely decreased if patient taking Biotin.     MAGNESIUM - Normal   CK TOTAL AND CKMB - Normal    Narrative:     CKMB results may be falsely decreased if patient taking Biotin.   AMMONIA - Normal   POCT TROPONIN I - Normal   POCT TROPONIN I - Normal   RAINBOW DRAW    Narrative:     The following orders were created for panel order [place] Draw.  Procedure                               Abnormality         Status                     ---------                               -----------         ------                     Green Top (Gel)[[id number]]                                  Final result               Lavender Top[[id number]]                                     Final  result               Gold Top - SST[[id number]]                                   Final result               Light Blue Top[[id number]]                                   Final result                 Please view results for these tests on the individual orders.   POCT TROPONIN I   POCT TROPONIN I   POCT TROPONIN-I WITH HOLD TUBE    Narrative:     The following orders were created for panel order POC Troponin I with Hold Tube.  Procedure                               Abnormality         Status                     ---------                               -----------         ------                     POC Troponin I[[id number]]                                                              HOLD Troponin-I Tube[[id number]]                             Final result                 Please view results for these tests on the individual orders.   CBC AND DIFFERENTIAL    Narrative:     The following orders were created for panel order CBC & Differential.  Procedure                               Abnormality         Status                     ---------                               -----------         ------                     CBC Auto Differential[[id number]]        Abnormal            Final result                 Please view results for these tests on the individual orders.   GREEN TOP   LAVENDER TOP   GOLD TOP - SST   LIGHT BLUE TOP   HOLD ISTAT TROPONIN-I TUBE   HOLD ISTAT TROPONIN-I TUBE   POCT TROPONIN-I WITH HOLD TUBE    Narrative:     The following orders were created for panel order POC Troponin I with Hold Tube.  Procedure                               Abnormality         Status                     ---------                               -----------         ------                     POC Troponin I[[id number]]                                                              HOLD Troponin-I Tube[[id number]]                             Final result                 Please view results for these tests on the individual orders.        Lab Results (last 24 hours)     ** No results found for the last 24 hours. **           Imaging:    XR Chest 1 View    Result Date: 12/4/2021  Narrative: PROCEDURE: XR CHEST 1 VW  COMPARISON: [facility], SUMAN, ABDOMEN SERIES ACUTE, 11/06/2019, 21:34.  [facility], CR, CHEST AP/PA 1 VIEW, 4/05/2012, 18:34.  INDICATIONS: Chest Pain triage protocol  FINDINGS:  The heart is normal in size.  The lungs are well-expanded.  Chronic changes appear stable.  Bony structures appear intact.  CONCLUSION:  Chronic changes appear stable.  No active disease is seen.       OSMIN MARIE MD       Electronically Signed and Approved By: OSMIN MARIE MD on 12/04/2021 at 17:26               No Radiology Exams Resulted Within Past 24 Hours    Procedures:  Procedures    Progress                            Medical Decision Making:  MDM  Number of Diagnoses or Management Options  Alcohol dependence with uncomplicated intoxication (HCC)  Diagnosis management comments: After medications in the ED, the patient is sedated but she is oriented and states that she feels better.  Once she is fully awake, sober, and ambulatory she will be discharged home.       Amount and/or Complexity of Data Reviewed  Clinical lab tests: reviewed  Tests in the radiology section of CPT®: reviewed  Tests in the medicine section of CPT®: reviewed  Decide to obtain previous medical records or to obtain history from someone other than the patient: yes  Obtain history from someone other than the patient: yes  Review and summarize past medical records: yes  Independent visualization of images, tracings, or specimens: yes    Patient Progress  Patient progress: improved       Final diagnoses:   Alcohol dependence with uncomplicated intoxication (HCC)        Disposition:  ED Disposition     ED Disposition Condition Comment    Discharge Stable           Documentation assistance provided by Alina Ortiz acting as scribe for Shawn Bowen  DO RUPINDER. Information recorded by the scribe was done at my direction and has been verified and validated by me.        Alina Ortiz  12/04/21 1727       Shawn Bowen DO  12/06/21 1132       Shawn Bowen DO  12/06/21 1132

## 2021-12-04 NOTE — ED NOTES
Pt began complaining of chest tightness while in the waiting room. Pt pulled back to triage and new orders placed for chest pain     Ofelia Williamson RN  12/04/21 1750

## 2021-12-27 DIAGNOSIS — N95.2 VAGINAL ATROPHY: ICD-10-CM

## 2021-12-27 RX ORDER — ESTRADIOL 0.1 MG/G
CREAM VAGINAL
Qty: 42.5 G | Refills: 2 | Status: SHIPPED | OUTPATIENT
Start: 2021-12-27 | End: 2022-03-18

## 2022-01-19 ENCOUNTER — TRANSCRIBE ORDERS (OUTPATIENT)
Dept: ADMINISTRATIVE | Facility: HOSPITAL | Age: 52
End: 2022-01-19

## 2022-01-19 DIAGNOSIS — Z12.39 OTHER SCREENING BREAST EXAMINATION: Primary | ICD-10-CM

## 2022-03-17 DIAGNOSIS — N95.2 VAGINAL ATROPHY: ICD-10-CM

## 2022-03-18 RX ORDER — ESTRADIOL 0.1 MG/G
CREAM VAGINAL
Qty: 42.5 G | Refills: 2 | Status: SHIPPED | OUTPATIENT
Start: 2022-03-18 | End: 2022-06-13

## 2022-03-25 ENCOUNTER — HOSPITAL ENCOUNTER (OUTPATIENT)
Dept: MAMMOGRAPHY | Facility: HOSPITAL | Age: 52
Discharge: HOME | End: 2022-03-25
Admitting: NURSE PRACTITIONER

## 2022-03-25 DIAGNOSIS — Z12.39 OTHER SCREENING BREAST EXAMINATION: ICD-10-CM

## 2022-03-25 PROCEDURE — 77063 BREAST TOMOSYNTHESIS BI: CPT

## 2022-03-25 PROCEDURE — 77067 SCR MAMMO BI INCL CAD: CPT

## 2022-04-04 PROBLEM — R53.83 FATIGUE: Status: ACTIVE | Noted: 2021-12-15

## 2022-04-04 PROBLEM — F17.200 NICOTINE DEPENDENCE: Status: ACTIVE | Noted: 2018-01-03

## 2022-04-04 PROBLEM — F17.200 SMOKER: Status: ACTIVE | Noted: 2021-08-11

## 2022-04-04 PROBLEM — G25.81 RESTLESS LEGS SYNDROME: Status: ACTIVE | Noted: 2018-01-03

## 2022-04-04 PROBLEM — J43.9 EMPHYSEMA, UNSPECIFIED: Status: ACTIVE | Noted: 2022-02-02

## 2022-04-04 PROBLEM — M23.50: Status: ACTIVE | Noted: 2018-04-20

## 2022-04-04 PROBLEM — G43.009 MIGRAINE WITHOUT AURA OR STATUS MIGRAINOSUS: Status: ACTIVE | Noted: 2021-08-11

## 2022-04-04 PROBLEM — K21.9 GASTROESOPHAGEAL REFLUX DISEASE WITHOUT ESOPHAGITIS: Status: ACTIVE | Noted: 2021-08-11

## 2022-04-04 PROBLEM — F41.1 GENERALIZED ANXIETY DISORDER: Status: ACTIVE | Noted: 2018-01-03

## 2022-04-04 PROBLEM — Z00.00 ENCOUNTER FOR ROUTINE ADULT HEALTH EXAMINATION: Status: ACTIVE | Noted: 2021-08-11

## 2022-04-04 PROBLEM — R25.2 MUSCLE CRAMPS: Status: ACTIVE | Noted: 2021-12-15

## 2022-04-04 PROBLEM — D72.829 LEUKOCYTOSIS: Status: ACTIVE | Noted: 2018-04-02

## 2022-04-04 PROBLEM — F10.20 ALCOHOL DEPENDENCE, EPISODIC: Status: ACTIVE | Noted: 2021-04-29

## 2022-04-04 PROBLEM — J30.9 ALLERGIC RHINITIS: Status: ACTIVE | Noted: 2021-08-11

## 2022-04-04 PROBLEM — F33.0 MILD EPISODE OF RECURRENT MAJOR DEPRESSIVE DISORDER: Status: ACTIVE | Noted: 2021-08-11

## 2022-04-06 ENCOUNTER — TELEPHONE (OUTPATIENT)
Dept: SURGERY | Facility: CLINIC | Age: 52
End: 2022-04-06

## 2022-04-06 NOTE — TELEPHONE ENCOUNTER
PATIENT'S PASSPORT REFERRAL . CALLED THE PCP BUT THEY WERE OUT ON LUNCH. CALLED THE PT AND SPOKE WITH HER AND TOLD HER THAT SHE NEEDED TO CALL HER PCP TO GET ANOTHER PASSPORT REFERRAL.

## 2022-04-07 ENCOUNTER — OFFICE VISIT (OUTPATIENT)
Dept: UROLOGY | Facility: CLINIC | Age: 52
End: 2022-04-07

## 2022-04-07 VITALS — BODY MASS INDEX: 28.71 KG/M2 | HEIGHT: 64 IN | RESPIRATION RATE: 18 BRPM

## 2022-04-07 DIAGNOSIS — N39.0 RECURRENT UTI: Primary | ICD-10-CM

## 2022-04-07 PROCEDURE — 99213 OFFICE O/P EST LOW 20 MIN: CPT | Performed by: UROLOGY

## 2022-04-07 RX ORDER — LIOTHYRONINE SODIUM 5 UG/1
2.5 TABLET ORAL 2 TIMES DAILY
COMMUNITY
Start: 2022-03-22

## 2022-04-07 RX ORDER — FOLIC ACID 1 MG/1
1000 TABLET ORAL DAILY
COMMUNITY
Start: 2022-03-22

## 2022-04-07 RX ORDER — BACLOFEN 10 MG/1
TABLET ORAL
COMMUNITY
Start: 2022-04-04 | End: 2023-01-14

## 2022-04-07 RX ORDER — POTASSIUM CHLORIDE 20 MEQ/1
20 TABLET, EXTENDED RELEASE ORAL DAILY
COMMUNITY
Start: 2022-03-22 | End: 2022-06-19

## 2022-04-07 NOTE — PROGRESS NOTES
UROLOGY OFFICE follow-up NOTE    Subjective   HPI  Vaishnavi Moses is a 51 y.o. female.  Presents for follow-up of recurrent urinary tract infection. The patient states that she had a severe urinary tract infection 03/01/2022. The patient states that the pain started in her left flank; she states that she had a burning, tingling feeling. She states that the pain shifted to a nauseating pain and became worse. She mentions that she couldn't eat.    The patient was initially treating the infection with Bactrim, but had to switch to Macrobid as the bacteria was resistant to Bactrim. The patient took Macrobid for several days and the infection resolved.  Feeling better today; no UTI symptoms.    The patient is currently using estradiol cream 3 times a week.   The patient is also taking Lasix.   The patient denies taking a cranberry supplement.    The patient mentions that she had 3 UTIs since 2020. She mentions that before 2020, she had 4 UTIs and had to go to the hospital.    The patient had a cystoscopy 01/2021.    H/o chronic back pain.   H/o hysterectomy, has ovaries; thinks she may have gone through menopause.     _________________  Urine culture   3/1/2022: > 100,000 E. coli (ESBL)  11/9/2020: > 100,000 E. coli, ESBL  9/2/2020: Klebsiella, [id number]  6/29/2020: >100,000 ESBL    UTI diagnostic report, molecular diagnostic performed, 11/2/2020: Positive only for E. coli no resistance noted             Results for orders placed or performed during the hospital encounter of 12/04/21   Chlamydia trachomatis, Neisseria gonorrhoeae, PCR - Urine, Urine, Clean Catch    Specimen: Urine, Clean Catch   Result Value Ref Range    Chlamydia DNA by PCR Not Detected Not Detected     Neisseria gonorrhoeae by PCR Not Detected Not Detected    Comprehensive Metabolic Panel    Specimen: Blood   Result Value Ref Range    Glucose 84 65 - 99 mg/dL    BUN 8 6 - 20 mg/dL    Creatinine 0.81 0.57 - 1.00 mg/dL    Sodium 141 136 - 145  mmol/L    Potassium 3.8 3.5 - 5.2 mmol/L    Chloride 105 98 - 107 mmol/L    CO2 24.4 22.0 - 29.0 mmol/L    Calcium 8.3 (L) 8.6 - 10.5 mg/dL    Total Protein 6.6 6.0 - 8.5 g/dL    Albumin 3.60 3.50 - 5.20 g/dL    ALT (SGPT) 15 1 - 33 U/L    AST (SGOT) 19 1 - 32 U/L    Alkaline Phosphatase 100 39 - 117 U/L    Total Bilirubin 0.2 0.0 - 1.2 mg/dL    eGFR Non African Amer 75 >60 mL/min/1.73    Globulin 3.0 gm/dL    A/G Ratio 1.2 g/dL    BUN/Creatinine Ratio 9.9 7.0 - 25.0    Anion Gap 11.6 5.0 - 15.0 mmol/L   Lipase    Specimen: Blood   Result Value Ref Range    Lipase 49 13 - 60 U/L   BNP    Specimen: Blood   Result Value Ref Range    proBNP 22.3 0.0 - 450.0 pg/mL   Magnesium    Specimen: Blood   Result Value Ref Range    Magnesium 1.7 1.6 - 2.6 mg/dL   CK Total & CKMB    Specimen: Blood   Result Value Ref Range    CKMB 2.63 <=5.30 ng/mL    Creatine Kinase 66 20 - 180 U/L   CBC Auto Differential    Specimen: Blood   Result Value Ref Range    WBC 8.57 3.40 - 10.80 10*3/mm3    RBC 4.42 3.77 - 5.28 10*6/mm3    Hemoglobin 14.8 12.0 - 15.9 g/dL    Hematocrit 43.8 34.0 - 46.6 %    MCV 99.1 (H) 79.0 - 97.0 fL    MCH 33.5 (H) 26.6 - 33.0 pg    MCHC 33.8 31.5 - 35.7 g/dL    RDW 13.8 12.3 - 15.4 %    RDW-SD 50.5 37.0 - 54.0 fl    MPV 9.9 6.0 - 12.0 fL    Platelets 218 140 - 450 10*3/mm3    Neutrophil % 60.9 42.7 - 76.0 %    Lymphocyte % 30.2 19.6 - 45.3 %    Monocyte % 7.1 5.0 - 12.0 %    Eosinophil % 0.8 0.3 - 6.2 %    Basophil % 0.6 0.0 - 1.5 %    Immature Grans % 0.4 0.0 - 0.5 %    Neutrophils, Absolute 5.22 1.70 - 7.00 10*3/mm3    Lymphocytes, Absolute 2.59 0.70 - 3.10 10*3/mm3    Monocytes, Absolute 0.61 0.10 - 0.90 10*3/mm3    Eosinophils, Absolute 0.07 0.00 - 0.40 10*3/mm3    Basophils, Absolute 0.05 0.00 - 0.20 10*3/mm3    Immature Grans, Absolute 0.03 0.00 - 0.05 10*3/mm3    nRBC 0.0 0.0 - 0.2 /100 WBC   Lactic Acid, Plasma    Specimen: Arm, Left; Blood   Result Value Ref Range    Lactate 2.2 (C) 0.5 - 2.0 mmol/L    Ethanol    Specimen: Blood   Result Value Ref Range    Ethanol 280 (H) 0 - 10 mg/dL    Ethanol % 0.280 %   Ammonia    Specimen: Arm, Left; Blood   Result Value Ref Range    Ammonia 34 11 - 51 umol/L   Protime-INR    Specimen: Blood   Result Value Ref Range    Protime 11.2 9.4 - 12.0 Seconds    INR 1.08 (L) 2.00 - 3.00   POC Troponin I    Specimen: Blood   Result Value Ref Range    Troponin I 0.00 0.00 - 0.60 ng/mL   POC Troponin I    Specimen: Blood   Result Value Ref Range    Troponin I 0.00 0.00 - 0.60 ng/mL   ECG 12 Lead   Result Value Ref Range    QT Interval 404 ms   ECG 12 Lead   Result Value Ref Range    QT Interval 385 ms   Green Top (Gel)   Result Value Ref Range    Extra Tube Hold for add-ons.    Lavender Top   Result Value Ref Range    Extra Tube hold for add-on    Gold Top - SST   Result Value Ref Range    Extra Tube Hold for add-ons.    Light Blue Top   Result Value Ref Range    Extra Tube hold for add-on    HOLD Troponin-I Tube   Result Value Ref Range    Extra Tube Hold for add-ons.    HOLD Troponin-I Tube   Result Value Ref Range    Extra Tube Hold for add-ons.          Medical History:  Past Medical History:   Diagnosis Date   • Condition not found     Ulcer   • Degeneration of lumbosacral intervertebral disc 04/28/2015   • High cholesterol    • Limb pain    • Limb swelling    • Pain, lumbar region    • Radiculopathy, lumbosacral region 04/28/2015   • Spondylisthesis 04/28/2015    lumbar L5/S1        Social History:  Social History     Socioeconomic History   • Marital status: Single   Tobacco Use   • Smoking status: Current Every Day Smoker     Packs/day: 1.00   • Smokeless tobacco: Former User   • Tobacco comment: smoked 21-30 years   Substance and Sexual Activity   • Alcohol use: Never   • Drug use: Never        Family History:  Family History   Problem Relation Age of Onset   • Heart disease Father    • Diabetes Father    • Cancer Maternal Grandfather    • Heart disease Brother          Surgical History:  Past Surgical History:   Procedure Laterality Date   • ABDOMINAL HYSTERECTOMY     • APPENDECTOMY     • CHOLECYSTECTOMY     • HERNIA REPAIR          Allergies:  Allergies   Allergen Reactions   • Penicillins Unknown - High Severity        Current Medications:  Current Outpatient Medications   Medication Sig Dispense Refill   • atorvastatin (LIPITOR) 20 MG tablet      • baclofen (LIORESAL) 10 MG tablet      • chlordiazePOXIDE (LIBRIUM) 25 MG capsule Take 1 capsule by mouth 4 (Four) Times a Day As Needed for Anxiety or Withdrawal. 20 capsule 0   • colestipol (COLESTID) 1 g tablet colestipol 1 gram oral tablet take 1 tablet (1 gram) by oral route 2 times per day swallowing whole with any liquid. Do not crush, chew and/or divide.   Active     • ergocalciferol (ERGOCALCIFEROL) 1.25 MG (65170 UT) capsule Vitamin D2 1,250 mcg (50,000 unit) oral capsule take 1 capsule by oral route once per week   Active     • escitalopram (LEXAPRO) 20 MG tablet Lexapro 20 mg oral tablet take 1 tablet (20 mg) by oral route once daily   Suspended     • estradiol (ESTRACE) 0.1 MG/GM vaginal cream APPLY A PEA SIZE AMOUNT VAGINALLY ONCE DAILY FOR 3 WEEKS, THEN TWICE WEEKLY FOR 2 WEEKS, THEN ONCE A WEEK FOR 1 MONTH 42.5 g 2   • folic acid (FOLVITE) 1 MG tablet Take 1,000 mcg by mouth Daily.     • furosemide (LASIX) 20 MG tablet      • gabapentin (NEURONTIN) 100 MG capsule Neurontin 100 mg oral capsule take 1 capsule by oral route 2 times a day   Suspended     • liothyronine (CYTOMEL) 5 MCG tablet Take 2.5 mcg by mouth 2 (Two) Times a Day.     • loratadine (CLARITIN) 10 MG tablet Claritin 10 mg oral tablet take 1 tablet (10 mg) by oral route once daily   Active     • multivitamin (THERAGRAN) tablet tablet      • Naproxen Sodium 220 MG capsule Aleve 220 mg oral capsule take 2 capsules by oral route daily   Suspended     • nicotine (NICODERM CQ) 21 MG/24HR patch apply 1 patch to THE SKIN EVERY DAY     • omeprazole (priLOSEC)  "20 MG capsule Prilosec 20 mg oral capsule,delayed release(DR/EC) take 1 capsule (20 mg) by oral route once daily before a meal   Suspended     • omeprazole (priLOSEC) 40 MG capsule Take 40 mg by mouth Daily.     • oxybutynin (DITROPAN) 5 MG tablet oxybutynin chloride 5 mg oral tablet take 1 tablet (5 mg) by oral route 3 times per day   Active     • oxybutynin XL (DITROPAN-XL) 5 MG 24 hr tablet Take 5 mg by mouth Daily.     • oxyCODONE-acetaminophen (PERCOCET) 7.5-325 MG per tablet      • potassium chloride (K-DUR,KLOR-CON) 20 MEQ CR tablet Take 20 mEq by mouth Daily.     • POTASSIUM PO      • promethazine (PHENERGAN) 25 MG tablet Take 25 mg by mouth 4 (Four) Times a Day As Needed.     • rOPINIRole (REQUIP) 0.5 MG tablet      • sertraline (ZOLOFT) 50 MG tablet Zoloft 50 mg oral tablet take 1 tablet (50 mg) by oral route once daily   Active     • SUMAtriptan (IMITREX) 25 MG tablet      • topiramate (TOPAMAX) 25 MG tablet      • traMADol (ULTRAM) 50 MG tablet tramadol 50 mg oral tablet take 1 tablet by oral route 2 times a day   Suspended     • traZODone (DESYREL) 100 MG tablet      • traZODone (DESYREL) 50 MG tablet trazodone 50 mg oral tablet take 1 tablet by oral route daily   Suspended       No current facility-administered medications for this visit.       Review of systems  A review of systems was performed, and positive findings are noted in the HPI.    Objective     Vital Signs:   Resp 18   Ht 162.6 cm (64.02\")   BMI 28.71 kg/m²       Physical exam  No acute distress, well-nourished  Awake alert and oriented  Mood normal; affect normal    Problem List:  Patient Active Problem List   Diagnosis   • Congenital spondylolisthesis   • Degeneration of lumbosacral intervertebral disc   • High cholesterol   • Limb pain   • Limb swelling   • Low back pain   • Thoracic or lumbosacral neuritis or radiculitis   • Ulcerative lesion   • Recurrent UTI   • Vaginal atrophy   • Allergic rhinitis   • Body mass index (BMI) of " 27.0-27.9 in adult   • Emphysema, unspecified (HCC)   • Encounter for routine adult health examination   • Fatigue   • Gastroesophageal reflux disease without esophagitis   • Generalized anxiety disorder   • Leukocytosis   • Migraine without aura or status migrainosus   • Alcohol dependence, episodic (HCC)   • Mild episode of recurrent major depressive disorder (HCC)   • Muscle cramps   • Nicotine dependence   • Old disruption of ligament of knee   • Restless legs syndrome   • Smoker       Assessment/Plan   Diagnoses and all orders for this visit:    1. Recurrent UTI (Primary)      Recurrent urinary tract infections  -We discussed treatment for urinary tract infections.   Encouraged her to continue the estradiol cream as prescribed.    Encouraged ample hydration not delaying urgency.    She was advised to add in a daily cranberry supplement and we discussed its benefits.   Continue on-demand treatment for UTI per culture sensitivity; the patient was provided urine specimen cups. She is to notify us if she feels like she has a UTI so the culture may be obtained and treated as appropriate.      Plan for follow-up in 6 months.   All questions and concerns have been addressed at this time.         Transcribed from ambient dictation for Ofelia Betts MD by Faby Renner.  04/07/22   22:23 EDT    Patient verbalized consent to the visit recording.  I have personally performed the services described in this document as transcribed by the above individual, and it is both accurate and complete.  Ofelia Betts MD  4/8/2022  20:50 EDT

## 2022-06-13 DIAGNOSIS — N95.2 VAGINAL ATROPHY: ICD-10-CM

## 2022-06-13 RX ORDER — ESTRADIOL 0.1 MG/G
CREAM VAGINAL
Qty: 42.5 G | Refills: 2 | Status: SHIPPED | OUTPATIENT
Start: 2022-06-13 | End: 2022-09-28

## 2022-06-19 PROCEDURE — 87086 URINE CULTURE/COLONY COUNT: CPT

## 2022-06-19 PROCEDURE — 87077 CULTURE AEROBIC IDENTIFY: CPT

## 2022-06-19 PROCEDURE — 87186 SC STD MICRODIL/AGAR DIL: CPT

## 2022-06-23 ENCOUNTER — TELEPHONE (OUTPATIENT)
Dept: URGENT CARE | Facility: CLINIC | Age: 52
End: 2022-06-23

## 2022-06-23 DIAGNOSIS — N39.0 ACUTE UTI: Primary | ICD-10-CM

## 2022-06-23 RX ORDER — NITROFURANTOIN 25; 75 MG/1; MG/1
100 CAPSULE ORAL 2 TIMES DAILY
Qty: 14 CAPSULE | Refills: 0 | Status: SHIPPED | OUTPATIENT
Start: 2022-06-23 | End: 2022-06-30

## 2022-06-23 NOTE — TELEPHONE ENCOUNTER
Patient is contacted regarding positive urine culture results for Enterococcus.  A [facility] [place] [facility] Macrobid 100 mg twice daily for 7 days.  Patient has been encouraged to follow-up with her doctor or to return to [facility] in 2 to 3 days if not seeing improvement.

## 2022-09-26 DIAGNOSIS — N95.2 VAGINAL ATROPHY: ICD-10-CM

## 2022-09-28 RX ORDER — ESTRADIOL 0.1 MG/G
CREAM VAGINAL
Qty: 42.5 G | Refills: 2 | Status: SHIPPED | OUTPATIENT
Start: 2022-09-28 | End: 2022-10-24

## 2022-10-07 ENCOUNTER — TELEPHONE (OUTPATIENT)
Dept: UROLOGY | Facility: CLINIC | Age: 52
End: 2022-10-07

## 2022-10-22 DIAGNOSIS — N95.2 VAGINAL ATROPHY: ICD-10-CM

## 2022-10-24 RX ORDER — ESTRADIOL 0.1 MG/G
CREAM VAGINAL
Qty: 42.5 G | Refills: 2 | Status: SHIPPED | OUTPATIENT
Start: 2022-10-24 | End: 2023-03-14

## 2023-01-18 ENCOUNTER — TRANSCRIBE ORDERS (OUTPATIENT)
Dept: ADMINISTRATIVE | Facility: HOSPITAL | Age: 53
End: 2023-01-18
Payer: COMMERCIAL

## 2023-01-18 DIAGNOSIS — Z12.31 SCREENING MAMMOGRAM FOR HIGH-RISK PATIENT: Primary | ICD-10-CM

## 2023-03-14 DIAGNOSIS — N95.2 VAGINAL ATROPHY: ICD-10-CM

## 2023-03-14 RX ORDER — ESTRADIOL 0.1 MG/G
CREAM VAGINAL
Qty: 42.5 G | Refills: 2 | Status: SHIPPED | OUTPATIENT
Start: 2023-03-14

## 2023-03-17 ENCOUNTER — TRANSCRIBE ORDERS (OUTPATIENT)
Dept: ADMINISTRATIVE | Facility: HOSPITAL | Age: 53
End: 2023-03-17
Payer: COMMERCIAL

## 2023-03-17 DIAGNOSIS — Z78.0 ASYMPTOMATIC MENOPAUSAL STATE: Primary | ICD-10-CM

## 2023-04-25 ENCOUNTER — HOSPITAL ENCOUNTER (OUTPATIENT)
Dept: BONE DENSITY | Facility: HOSPITAL | Age: 53
Discharge: HOME | End: 2023-04-25
Admitting: NURSE PRACTITIONER
Payer: COMMERCIAL

## 2023-04-25 DIAGNOSIS — Z78.0 ASYMPTOMATIC MENOPAUSAL STATE: ICD-10-CM

## 2023-04-25 PROCEDURE — 77080 DXA BONE DENSITY AXIAL: CPT

## 2023-08-09 ENCOUNTER — OFFICE VISIT (OUTPATIENT)
Dept: CARDIOLOGY | Facility: CLINIC | Age: 53
End: 2023-08-09
Payer: COMMERCIAL

## 2023-08-09 VITALS
SYSTOLIC BLOOD PRESSURE: 113 MMHG | HEART RATE: 66 BPM | BODY MASS INDEX: 36.33 KG/M2 | DIASTOLIC BLOOD PRESSURE: 80 MMHG | HEIGHT: 64 IN | WEIGHT: 212.8 LBS

## 2023-08-09 DIAGNOSIS — E66.01 SEVERE OBESITY (BMI 35.0-39.9) WITH COMORBIDITY: ICD-10-CM

## 2023-08-09 DIAGNOSIS — Z82.49 FAMILY HISTORY OF ISCHEMIC HEART DISEASE (IHD): Primary | ICD-10-CM

## 2023-08-09 DIAGNOSIS — Z01.810 PREOPERATIVE CARDIOVASCULAR EXAMINATION: ICD-10-CM

## 2023-08-09 DIAGNOSIS — E78.2 MIXED DYSLIPIDEMIA: ICD-10-CM

## 2023-08-09 RX ORDER — BACLOFEN 10 MG/1
10 TABLET ORAL
COMMUNITY

## 2023-08-09 RX ORDER — FUROSEMIDE 20 MG/1
1 TABLET ORAL DAILY PRN
COMMUNITY

## 2023-08-09 RX ORDER — NALTREXONE 380 MG
KIT INTRAMUSCULAR
COMMUNITY
Start: 2023-07-17

## 2023-08-09 NOTE — PROGRESS NOTES
Chief Complaint  Hyperlipidemia      History of Present Illness  Vaishnavi Moses presents to [facility] CARDIOLOGY    This is a very pleasant 53-year-old lady with obesity, family history of ischemic heart disease and dyslipidemia presents to clinic for cardiac evaluation.  She has family history of premature CAD.  Her brother  of heart attack at the age of 28.  She has good symptoms wise.  She has no chest discomfort or dyspnea.  She has no palpitations, dizziness, presyncope or syncope.  She is physically active taking care of her grandkids without extraordinary limitations.      Past Medical History:   Diagnosis Date    Condition not found     Ulcer    Degeneration of lumbosacral intervertebral disc 2015    High cholesterol     Limb pain     Limb swelling     Pain, lumbar region     Radiculopathy, lumbosacral region 2015    Spondylisthesis 2015    lumbar L5/S1         Current Outpatient Medications:     acetaminophen (TYLENOL) 500 MG tablet, Take 1 tablet by mouth Every 6 (Six) Hours As Needed for Mild Pain., Disp: 30 tablet, Rfl: 0    atorvastatin (LIPITOR) 20 MG tablet, , Disp: , Rfl:     baclofen (LIORESAL) 10 MG tablet, Take 1 tablet by mouth every night at bedtime., Disp: , Rfl:     buPROPion XL (WELLBUTRIN XL) 300 MG 24 hr tablet, Take 1 tablet by mouth Every Morning., Disp: , Rfl:     chlordiazePOXIDE (LIBRIUM) 25 MG capsule, Take 1 capsule by mouth 4 (Four) Times a Day As Needed for Anxiety or Withdrawal., Disp: 20 capsule, Rfl: 0    clindamycin (CLEOCIN) 300 MG capsule, Take 1 capsule by mouth 3 (Three) Times a Day., Disp: 30 capsule, Rfl: 0    colestipol (COLESTID) 1 g tablet, colestipol 1 gram oral tablet take 1 tablet (1 gram) by oral route 2 times per day swallowing whole with any liquid. Do not crush, chew and/or divide.   Active, Disp: , Rfl:     estradiol (ESTRACE) 0.1 MG/GM vaginal cream, APPLY A PEA SIZE AMOUNT VAGINALLY ONCE DAILY x3 WEEKS THEN TWICE  A WEEK x2 WEEKS, THEN ONCE A WEEK x1 MONTH, Disp: 42.5 g, Rfl: 2    folic acid (FOLVITE) 1 MG tablet, Take 1 tablet by mouth Daily., Disp: , Rfl:     furosemide (LASIX) 20 MG tablet, Take 1 tablet by mouth Daily As Needed., Disp: , Rfl:     gabapentin (NEURONTIN) 100 MG capsule, Neurontin 100 mg oral capsule take 1 capsule by oral route 2 times a day   Suspended, Disp: , Rfl:     Lactobacillus (Florajen Acidophilus) capsule, Take 1 capsule by mouth 2 (Two) Times a Day., Disp: 24 capsule, Rfl: 0    levothyroxine (SYNTHROID, LEVOTHROID) 50 MCG tablet, , Disp: , Rfl:     liothyronine (CYTOMEL) 5 MCG tablet, Take 0.5 tablets by mouth 2 (Two) Times a Day., Disp: , Rfl:     loratadine (CLARITIN) 10 MG tablet, Claritin 10 mg oral tablet take 1 tablet (10 mg) by oral route once daily   Active, Disp: , Rfl:     multivitamin (THERAGRAN) tablet tablet, , Disp: , Rfl:     naproxen (Naprosyn) 500 MG tablet, Take 1 tablet by mouth 2 (Two) Times a Day With Meals., Disp: 20 tablet, Rfl: 0    omeprazole (priLOSEC) 40 MG capsule, Take 1 capsule by mouth Daily. BID, Disp: , Rfl:     ondansetron (ZOFRAN) 4 MG tablet, ondansetron HCl 4 mg tablet, Disp: , Rfl:     oxybutynin XL (DITROPAN-XL) 5 MG 24 hr tablet, Take 1 tablet by mouth Daily., Disp: , Rfl:     potassium chloride 10 MEQ CR tablet, Take 1 tablet by mouth Daily., Disp: , Rfl:     rOPINIRole (REQUIP) 1 MG tablet, , Disp: , Rfl:     sertraline (ZOLOFT) 100 MG tablet, Take 1 tablet by mouth Daily., Disp: , Rfl:     SUMAtriptan (IMITREX) 25 MG tablet, , Disp: , Rfl:     topiramate (TOPAMAX) 25 MG tablet, , Disp: , Rfl:     traZODone (DESYREL) 300 MG tablet, trazodone 300 mg tablet  Take 1 tablet every day by oral route at bedtime for 90 days., Disp: , Rfl:     Vivitrol 380 MG reconstituted suspension IM suspension, , Disp: , Rfl:     Vraylar 1.5 MG capsule capsule, Take 1 capsule by mouth Daily., Disp: , Rfl:     Medications Discontinued During This Encounter   Medication Reason  "   Naproxen Sodium 220 MG capsule *Therapy completed    nicotine (NICODERM CQ) 21 MG/24HR patch *Therapy completed    POTASSIUM PO *Therapy completed    traZODone (DESYREL) 100 MG tablet *Therapy completed     Allergies   Allergen Reactions    Penicillins Unknown - High Severity        Social History     Tobacco Use    Smoking status: Every Day     Packs/day: 1.00     Types: Cigarettes    Smokeless tobacco: Former    Tobacco comments:     smoked 21-30 years   Vaping Use    Vaping Use: Never used   Substance Use Topics    Alcohol use: Not Currently    Drug use: Never       Family History   Problem Relation Age of Onset    Heart disease Father     Diabetes Father     Cancer Maternal Grandfather     Heart disease Brother         Objective     /80   Pulse 66   Ht 162.6 cm (64.02\")   Wt 96.5 kg (212 lb 12.8 oz)   BMI 36.51 kg/mý       Physical Exam  Constitutional:       General: Awake. Not in acute distress.     Appearance: Normal appearance.   Neck:      Vascular: No carotid bruit, hepatojugular reflux or JVD.   Cardiovascular:      Rate and Rhythm: Normal rate and regular rhythm.      Chest Wall: PMI is not displaced.      Heart sounds: Normal heart sounds, S1 normal and S2 normal. No murmur heard.   No friction rub. No gallop. No S3 or S4 sounds.    Pulmonary:      Effort: Pulmonary effort is normal.      Breath sounds: Normal breath sounds. No wheezing, rhonchi or rales.   Ext.      No edema.   Skin:     General: Skin is warm and dry.      Coloration: Skin is not cyanotic.      Findings: No petechiae or rash.   Neurological:      Mental Status: Alert and oriented x 3  Psychiatric:         Behavior: Behavior is cooperative.       Result Review :     proBNP   Date Value Ref Range Status   12/04/2021 22.3 0.0 - 450.0 pg/mL Final          No results found for: TSH   No results found for: FREET4   No results found for: DDIMERQUANT  Magnesium   Date Value Ref Range Status   12/04/2021 1.7 1.6 - 2.6 mg/dL Final "      No results found for: DIGOXIN   No results found for: TROPONINT   Lab Results   Component Value Date    POCTROP 0.00 12/04/2021   (              ECG 12 Lead    Date/Time: 8/9/2023 10:05 AM  Performed by: Christina Mahmood MD  Authorized by: Christina Mahmood MD   Comparison: compared with previous ECG   Similar to previous ECG  Rhythm: sinus rhythm  BPM: 54  Other findings: left atrial abnormality          No results found for this or any previous visit.                Diagnoses and all orders for this visit:    1. Family history of ischemic heart disease (IHD) (Primary)  -     Treadmill Stress Test; Future  -     CT Cardiac Calcium Score Without Dye; Future  -     Lipoprotein A (LPA); Future    2. Mixed dyslipidemia    3. Severe obesity (BMI 35.0-39.9) with comorbidity    4. Preoperative cardiovascular examination    Other orders  -     ECG 12 Lead      Assessment:       Patient with family history of premature CAD.  Her exam today is benign except for obesity.  Her ECG shows normal sinus rhythm with left atrial enlargement.  Recent blood work including lipid profile was noted and was benign.  She will be continued on atorvastatin at the current dose.  I will check lipoprotein a level, treadmill stress test and cardiac CT scan for risk stratification.  Lifestyle changes including regular exercise, dietary changes and weight loss were discussed with the patient.  In fact, she is planned to undergo bariatric surgery within the near future.  She used to smoke but quit 2 weeks ago.  She was encouraged to remain quit.    Patient's functional capacity is estimated to be more than 4 METS.  She is at low risk for perioperative cardiac complications for bariatric surgery which may proceed as planned.    Follow Up       Return for Return to clinic after diagnostic testing, With Lashawn OWUSU.    Patient was given instructions and counseling regarding her condition or for health maintenance advice. Please see  specific information pulled into the AVS if appropriate.

## 2023-08-29 PROCEDURE — 87186 SC STD MICRODIL/AGAR DIL: CPT | Performed by: EMERGENCY MEDICINE

## 2023-08-29 PROCEDURE — 87077 CULTURE AEROBIC IDENTIFY: CPT | Performed by: EMERGENCY MEDICINE

## 2023-08-29 PROCEDURE — 87086 URINE CULTURE/COLONY COUNT: CPT | Performed by: EMERGENCY MEDICINE

## 2023-09-07 ENCOUNTER — HOSPITAL ENCOUNTER (OUTPATIENT)
Dept: CT IMAGING | Facility: HOSPITAL | Age: 53
Discharge: HOME | End: 2023-09-07
Admitting: INTERNAL MEDICINE

## 2023-09-07 DIAGNOSIS — Z82.49 FAMILY HISTORY OF ISCHEMIC HEART DISEASE (IHD): ICD-10-CM

## 2023-09-07 PROCEDURE — 75571 CT HRT W/O DYE W/CA TEST: CPT

## 2023-09-21 DIAGNOSIS — Z82.49 FAMILY HISTORY OF ISCHEMIC HEART DISEASE (IHD): Primary | ICD-10-CM

## 2023-09-21 DIAGNOSIS — Z01.810 PREOPERATIVE CARDIOVASCULAR EXAMINATION: ICD-10-CM

## 2023-09-26 ENCOUNTER — OFFICE VISIT (OUTPATIENT)
Dept: CARDIOLOGY | Facility: CLINIC | Age: 53
End: 2023-09-26
Payer: COMMERCIAL

## 2023-09-26 VITALS
WEIGHT: 201.8 LBS | SYSTOLIC BLOOD PRESSURE: 100 MMHG | HEART RATE: 73 BPM | HEIGHT: 64 IN | DIASTOLIC BLOOD PRESSURE: 70 MMHG | BODY MASS INDEX: 34.45 KG/M2

## 2023-09-26 DIAGNOSIS — Z01.810 PREOPERATIVE CARDIOVASCULAR EXAMINATION: ICD-10-CM

## 2023-09-26 DIAGNOSIS — E78.2 MIXED DYSLIPIDEMIA: ICD-10-CM

## 2023-09-26 DIAGNOSIS — Z82.49 FAMILY HISTORY OF ISCHEMIC HEART DISEASE (IHD): Primary | ICD-10-CM

## 2023-09-26 NOTE — PROGRESS NOTES
Chief Complaint  Family history of ischemic heart disease (IHD), Hyperlipidemia, and Follow-up (F/U post testing )    Subjective        History of Present Illness  Vaishnavi Moses presents to [facility] for follow up.  Patient is a 53-year-old female with past medical history outlined below who presents for follow-up on recent testing.  She is asymptomatic from a cardiac standpoint and has no complaints.  She has a significant family history of ischemic heart disease with a brother passing away at age 28 from a heart attack.  She denies any chest pain or discomfort, dyspnea, orthopnea, edema, dizziness, lightheadedness, syncope.      Past Medical History:   Diagnosis Date    Condition not found     Ulcer    Degeneration of lumbosacral intervertebral disc 04/28/2015    High cholesterol     Limb pain     Limb swelling     Pain, lumbar region     Radiculopathy, lumbosacral region 04/28/2015    Spondylisthesis 04/28/2015    lumbar L5/S1       ALLERGY  Allergies   Allergen Reactions    Penicillins Unknown - High Severity    Other Other (See Comments)        Past Surgical History:   Procedure Laterality Date    ABDOMINAL HYSTERECTOMY      APPENDECTOMY      CHOLECYSTECTOMY      HERNIA REPAIR          Social History     Socioeconomic History    Marital status: Single   Tobacco Use    Smoking status: Every Day     Packs/day: 1.00     Types: Cigarettes    Smokeless tobacco: Former    Tobacco comments:     smoked 21-30 years   Vaping Use    Vaping Use: Never used   Substance and Sexual Activity    Alcohol use: Not Currently    Drug use: Never    Sexual activity: Defer       Family History   Problem Relation Age of Onset    Heart disease Father     Diabetes Father     Cancer Maternal Grandfather     Heart disease Brother         Current Outpatient Medications on File Prior to Visit   Medication Sig    acetaminophen (TYLENOL) 500 MG tablet Take 1 tablet by mouth Every 6 (Six) Hours As Needed for  Mild Pain.    atorvastatin (LIPITOR) 20 MG tablet Take 1 tablet by mouth Daily.    baclofen (LIORESAL) 10 MG tablet Take 1 tablet by mouth every night at bedtime.    buPROPion XL (WELLBUTRIN XL) 300 MG 24 hr tablet Take 1 tablet by mouth Every Morning.    calcium citrate (CALCITRATE) 950 (200 Ca) MG tablet Take 1 tablet by mouth Daily.    colestipol (COLESTID) 1 g tablet Take 1 tablet by mouth 2 (Two) Times a Day.    estradiol (ESTRACE) 0.1 MG/GM vaginal cream APPLY A PEA SIZE AMOUNT VAGINALLY ONCE DAILY x3 WEEKS THEN TWICE A WEEK x2 WEEKS, THEN ONCE A WEEK x1 MONTH (Patient taking differently: Insert 2 g into the vagina 1 (One) Time Per Week.)    Ferrocite 324 MG tablet Take 1 tablet by mouth Every 12 (Twelve) Hours.    folic acid (FOLVITE) 1 MG tablet Take 1 tablet by mouth Daily.    furosemide (LASIX) 20 MG tablet Take 1 tablet by mouth Daily As Needed.    gabapentin (NEURONTIN) 100 MG capsule Take 1 capsule by mouth 2 (Two) Times a Day.    Lactobacillus (Florajen Acidophilus) capsule Take 1 capsule by mouth 2 (Two) Times a Day.    levothyroxine (SYNTHROID, LEVOTHROID) 50 MCG tablet Take 1 tablet by mouth Daily.    liothyronine (CYTOMEL) 5 MCG tablet Take 0.5 tablets by mouth 2 (Two) Times a Day.    loratadine (CLARITIN) 10 MG tablet Claritin 10 mg oral tablet take 1 tablet (10 mg) by oral route once daily   Active    multivitamin (THERAGRAN) tablet tablet     naproxen (Naprosyn) 500 MG tablet Take 1 tablet by mouth 2 (Two) Times a Day With Meals.    omeprazole (priLOSEC) 40 MG capsule Take 1 capsule by mouth Daily. BID    ondansetron (ZOFRAN) 4 MG tablet ondansetron HCl 4 mg tablet    oxybutynin XL (DITROPAN-XL) 5 MG 24 hr tablet Take 1 tablet by mouth Daily.    potassium chloride 10 MEQ CR tablet Take 1 tablet by mouth As Needed.    rOPINIRole (REQUIP) 1 MG tablet Take 1 tablet by mouth Every Night.    sertraline (ZOLOFT) 100 MG tablet Take 1 tablet by mouth Daily.    SUMAtriptan (IMITREX) 25 MG tablet Take 1  "tablet by mouth As Needed.    traZODone (DESYREL) 300 MG tablet Take 1 tablet by mouth Every Night.    Vivitrol 380 MG reconstituted suspension IM suspension 380 mg Daily.    Vraylar 1.5 MG capsule capsule Take 1 capsule by mouth Daily.    [DISCONTINUED] chlordiazePOXIDE (LIBRIUM) 25 MG capsule Take 1 capsule by mouth 4 (Four) Times a Day As Needed for Anxiety or Withdrawal. (Patient not taking: Reported on 9/26/2023)    [DISCONTINUED] clindamycin (CLEOCIN) 300 MG capsule Take 1 capsule by mouth 3 (Three) Times a Day. (Patient not taking: Reported on 9/26/2023)    [DISCONTINUED] nitrofurantoin, macrocrystal-monohydrate, (MACROBID) 100 MG capsule Take 1 capsule by mouth 2 (Two) Times a Day. (Patient not taking: Reported on 9/26/2023)    [DISCONTINUED] phenazopyridine (PYRIDIUM) 200 MG tablet Take 1 tablet by mouth 3 (Three) Times a Day As Needed for Bladder Spasms. (Patient not taking: Reported on 9/26/2023)    [DISCONTINUED] topiramate (TOPAMAX) 25 MG tablet  (Patient not taking: Reported on 9/26/2023)     No current facility-administered medications on file prior to visit.       Objective   Vitals:    09/26/23 1043   BP: 100/70   Pulse: 73   Weight: 91.5 kg (201 lb 12.8 oz)   Height: 162.6 cm (64\")       Physical Exam  Constitutional:       General: She is awake. She is not in acute distress.     Appearance: Normal appearance.   HENT:      Head: Normocephalic.      Nose: Nose normal. No congestion.   Eyes:      Extraocular Movements: Extraocular movements intact.      Conjunctiva/sclera: Conjunctivae normal.      Pupils: Pupils are equal, round, and reactive to light.   Neck:      Thyroid: No thyromegaly.      Vascular: No JVD.   Cardiovascular:      Rate and Rhythm: Normal rate and regular rhythm.      Chest Wall: PMI is not displaced.      Pulses: Normal pulses.      Heart sounds: Normal heart sounds, S1 normal and S2 normal. No murmur heard.    No friction rub. No gallop. No S3 or S4 sounds.   Pulmonary:      " Effort: Pulmonary effort is normal.      Breath sounds: Normal breath sounds. No wheezing, rhonchi or rales.   Abdominal:      General: Bowel sounds are normal.      Palpations: Abdomen is soft.      Tenderness: There is no abdominal tenderness.   Musculoskeletal:      Cervical back: No tenderness.      Right lower leg: No edema.      Left lower leg: No edema.   Lymphadenopathy:      Cervical: No cervical adenopathy.   Skin:     General: Skin is warm and dry.      Capillary Refill: Capillary refill takes less than 2 seconds.      Coloration: Skin is not cyanotic.      Findings: No petechiae or rash.      Nails: There is no clubbing.   Neurological:      Mental Status: She is alert.   Psychiatric:         Mood and Affect: Mood normal.         Behavior: Behavior is cooperative.         Result Review     The following data was reviewed by UMER Montenegro on 09/26/23.    proBNP   Date Value Ref Range Status   12/04/2021 22.3 0.0 - 450.0 pg/mL Final                Results for orders placed in visit on 09/19/23    Treadmill Stress Test    Interpretation Summary  Study was terminated 2 minutes into stage one of the standard Barry protocol.  Study was terminated secondary to shortness of breath.  No angina was reported.  Severely reduced aerobic functional capacity.  No significant arrhythmias were noted.  Baseline ECG showed normal sinus rhythm.  At peak stress, no ischemic ST-T changes were noted.  Study is inadequate due to severely reduced function capacity and inability to achieve target heart rate.  Consider pharmacological myocardial perfusion study if clinically indicated.         Procedures    Assessment & Plan  Diagnoses and all orders for this visit:    1. Family history of ischemic heart disease (IHD) (Primary)    2. Preoperative cardiovascular examination    3. Mixed dyslipidemia    1.  Family history of early onset ischemic heart disease.  Recent CT cardiac calcium scoring demonstrated a total calcium  score of 0 with a very low risk of coronary artery disease.  Patient was reassured.  Recent stress test was inconclusive due to inability to achieve target heart rate however in light of her recent CT cardiac calcium scoring no further testing is warranted at this time.  Patient was reassured.  2.  Patient's functional capacity is estimated at greater than 4 METS.  She is planned to undergo bariatric surgery in the future.  She is at low risk for perioperative cardiac complications and may proceed as planned.  3.  Continue statin therapy.    Follow Up   Return if symptoms worsen or fail to improve.    Patient was given instructions and counseling regarding her condition or for health maintenance advice. Please see specific information pulled into the AVS if appropriate.     Lashawn Alicea, UMER  09/26/23  11:02 EDT    Dictated Utilizing Dragon Dictation

## 2023-10-06 PROCEDURE — 87086 URINE CULTURE/COLONY COUNT: CPT

## 2023-10-06 PROCEDURE — 87088 URINE BACTERIA CULTURE: CPT

## 2023-10-06 PROCEDURE — 87186 SC STD MICRODIL/AGAR DIL: CPT

## 2023-11-28 NOTE — PROGRESS NOTES
Chief Complaint: recurrent uti and overacitve bladder    Subjective         History of Present Illness  Vaishnavi Moses is a 53 y.o. female presents to [facility] to be seen for follow-up.    Patient was previously seen by Dr. Ofelia Betts with last visit on 4/7/2022 for recurrent UTIs.  She is here for follow-up.    She reports she is using her estrace cream. She is not having any symptoms.   She reports that she was having symptoms with each of the cultures that were obtained. She reports gets upper abdominal pain and vomiting. She denies burning.     She is not taking a cranberry supplement.     Urine culture  10/6/2023 greater than 100,000 colony-forming's per mL E. coli ESBL resistant to levofloxacin, Bactrim, otherwise sensitive  8/29/2023 greater than 100,000 colony-forming's per mL of E. coli ESBL resistant to levofloxacin, Bactrim, otherwise sensitive  7/19/2023 mixed genital zane  7/7/2023 10,000-49,000 colony-forming units per mL of Enterococcus faecalis pansensitive  3/12/2023 greater than 100,000 colony-forming units per mL E. coli resistant to ciprofloxacin, levofloxacin, Bactrim, otherwise sensitive      Previous 4/7/2022:  Vaishnavi Moses is a 51 y.o. female.  Presents for follow-up of recurrent urinary tract infection. The patient states that she had a severe urinary tract infection 03/01/2022. The patient states that the pain started in her left flank; she states that she had a burning, tingling feeling. She states that the pain shifted to a nauseating pain and became worse. She mentions that she couldn't eat.     The patient was initially treating the infection with Bactrim, but had to switch to Macrobid as the bacteria was resistant to Bactrim. The patient took Macrobid for several days and the infection resolved.  Feeling better today; no UTI symptoms.     The patient is currently using estradiol cream 3 times a week.   The patient is also taking Lasix.    The patient denies taking a cranberry supplement.     The patient mentions that she had 3 UTIs since 2020. She mentions that before 2020, she had 4 UTIs and had to go to the hospital.     The patient had a cystoscopy 01/2021.     H/o chronic back pain.   H/o hysterectomy, has ovaries; thinks she may have gone through menopause.      _________________  Urine culture   3/1/2022: > 100,000 E. coli (ESBL)  11/9/2020: > 100,000 E. coli, ESBL  9/2/2020: Klebsiella, [id number]  6/29/2020: >100,000 ESBL     UTI diagnostic report, molecular diagnostic performed, 11/2/2020: Positive only for E. coli no resistance noted          Objective     Past Medical History:   Diagnosis Date    Condition not found     Ulcer    Degeneration of lumbosacral intervertebral disc 04/28/2015    High cholesterol     Limb pain     Limb swelling     Pain, lumbar region     Radiculopathy, lumbosacral region 04/28/2015    Spondylisthesis 04/28/2015    lumbar L5/S1       Past Surgical History:   Procedure Laterality Date    ABDOMINAL HYSTERECTOMY      APPENDECTOMY      CHOLECYSTECTOMY      HERNIA REPAIR           Current Outpatient Medications:     atorvastatin (LIPITOR) 20 MG tablet, Take 1 tablet by mouth Daily., Disp: , Rfl:     baclofen (LIORESAL) 10 MG tablet, Take 1 tablet by mouth every night at bedtime., Disp: , Rfl:     buPROPion XL (WELLBUTRIN XL) 300 MG 24 hr tablet, Take 1 tablet by mouth Every Morning., Disp: , Rfl:     calcium citrate (CALCITRATE) 950 (200 Ca) MG tablet, Take 1 tablet by mouth Daily., Disp: , Rfl:     colestipol (COLESTID) 1 g tablet, Take 1 tablet by mouth 2 (Two) Times a Day., Disp: , Rfl:     estradiol (ESTRACE) 0.1 MG/GM vaginal cream, APPLY A PEA SIZE AMOUNT VAGINALLY ONCE DAILY x3 WEEKS THEN TWICE A WEEK x2 WEEKS, THEN ONCE A WEEK x1 MONTH (Patient taking differently: Insert 2 applicators into the vagina 1 (One) Time Per Week.), Disp: 42.5 g, Rfl: 2    Ferrocite 324 MG tablet, Take 1 tablet by mouth Every 12  (Twelve) Hours., Disp: , Rfl:     folic acid (FOLVITE) 1 MG tablet, Take 1 tablet by mouth Daily., Disp: , Rfl:     furosemide (LASIX) 20 MG tablet, Take 1 tablet by mouth Daily As Needed., Disp: , Rfl:     gabapentin (NEURONTIN) 100 MG capsule, Take 1 capsule by mouth 2 (Two) Times a Day., Disp: , Rfl:     Lactobacillus (Florajen Acidophilus) capsule, Take 1 capsule by mouth 2 (Two) Times a Day., Disp: 24 capsule, Rfl: 0    levothyroxine (SYNTHROID, LEVOTHROID) 50 MCG tablet, Take 1 tablet by mouth Daily., Disp: , Rfl:     liothyronine (CYTOMEL) 5 MCG tablet, Take 0.5 tablets by mouth 2 (Two) Times a Day., Disp: , Rfl:     loratadine (CLARITIN) 10 MG tablet, Claritin 10 mg oral tablet take 1 tablet (10 mg) by oral route once daily   Active, Disp: , Rfl:     multivitamin (THERAGRAN) tablet tablet, , Disp: , Rfl:     naproxen (Naprosyn) 500 MG tablet, Take 1 tablet by mouth 2 (Two) Times a Day With Meals., Disp: 20 tablet, Rfl: 0    omeprazole (priLOSEC) 40 MG capsule, Take 1 capsule by mouth Daily. BID, Disp: , Rfl:     ondansetron ODT (ZOFRAN-ODT) 4 MG disintegrating tablet, Place 1 tablet on the tongue Every 8 (Eight) Hours As Needed for Nausea or Vomiting., Disp: 15 tablet, Rfl: 0    oxybutynin XL (DITROPAN-XL) 5 MG 24 hr tablet, Take 1 tablet by mouth Daily., Disp: , Rfl:     phenazopyridine (PYRIDIUM) 200 MG tablet, Take 1 tablet by mouth 3 (Three) Times a Day As Needed for Bladder Spasms or Dysuria., Disp: 6 tablet, Rfl: 0    potassium chloride 10 MEQ CR tablet, Take 1 tablet by mouth As Needed., Disp: , Rfl:     rOPINIRole (REQUIP) 1 MG tablet, Take 1 tablet by mouth Every Night., Disp: , Rfl:     sertraline (ZOLOFT) 100 MG tablet, Take 1 tablet by mouth Daily., Disp: , Rfl:     SUMAtriptan (IMITREX) 25 MG tablet, Take 1 tablet by mouth As Needed., Disp: , Rfl:     traZODone (DESYREL) 150 MG tablet, , Disp: , Rfl:     Vivitrol 380 MG reconstituted suspension IM suspension, 380 mg Daily., Disp: , Rfl:      "Vraylar 1.5 MG capsule capsule, Take 1 capsule by mouth Daily., Disp: , Rfl:     Allergies   Allergen Reactions    Penicillins Unknown - High Severity    Other Other (See Comments)        Family History   Problem Relation Age of Onset    Heart disease Father     Diabetes Father     Cancer Maternal Grandfather     Heart disease Brother        Social History     Socioeconomic History    Marital status: Single   Tobacco Use    Smoking status: Former     Packs/day: 1     Types: Cigarettes     Quit date:      Years since quittin.9    Smokeless tobacco: Former    Tobacco comments:     smoked 21-30 years   Vaping Use    Vaping Use: Never used   Substance and Sexual Activity    Alcohol use: Not Currently    Drug use: Never    Sexual activity: Defer       Vital Signs:   /67 (BP Location: Left arm, Patient Position: Sitting, Cuff Size: Adult)   Pulse 86   Ht 162.6 cm (64\")   Wt 85.3 kg (188 lb)   BMI 32.27 kg/m²      Physical Exam  Vitals reviewed.   Constitutional:       Appearance: Normal appearance.   Neurological:      General: No focal deficit present.      Mental Status: She is alert and oriented to person, place, and time.   Psychiatric:         Mood and Affect: Mood normal.         Behavior: Behavior normal.          Result Review :   The following data was reviewed by: UMER Olvera on 2023:  Results for orders placed or performed during the hospital encounter of 10/06/23   Urine Culture - Urine, Urine, Clean Catch    Specimen: Urine, Clean Catch   Result Value Ref Range    Urine Culture >100,000 CFU/mL Escherichia coli ESBL (A)        Susceptibility    Escherichia coli ESBL - KEVEN     Ertapenem  Susceptible ug/ml     Gentamicin  Susceptible ug/ml     Levofloxacin  Resistant ug/ml     Meropenem  Susceptible ug/ml     Nitrofurantoin  Susceptible ug/ml     Piperacillin + Tazobactam  Susceptible ug/ml     Trimethoprim + Sulfamethoxazole  Resistant ug/ml   POC Urinalysis Dipstick, " Multipro (Automated Dipstick)    Specimen: Urine   Result Value Ref Range    Color Yellow     Clarity, UA Clear     Glucose, UA Negative mg/dL    Bilirubin Negative     Ketones, UA Negative     Specific Gravity  1.025 1.005 - 1.030    Blood, UA Trace (A)     pH, Urine 8.5 (A) 5.0 - 8.0    Protein, POC Negative mg/dL    Urobilinogen, UA 0.2 E.U./dL     Nitrite, UA Positive (A)     Leukocytes Trace (A)         Bladder Scan interpretation 11/30/2023    Estimation of residual urine via BVI 3000 Verathon Bladder Scan  MA/nurse performing: Christine HILTON MA  Residual Urine: 0 ml  Indication: Recurrent UTI   Position: Supine  Examination: Incremental scanning of the suprapubic area using 2.0 MHz transducer using copious amounts of acoustic gel.   Findings: An anechoic area was demonstrated which represented the bladder, with measurement of residual urine as noted. I inspected this myself. In that the residual urine was  insignificant, refer to plan for treatment and plan necessary at this time.           Procedures        Assessment and Plan    Diagnoses and all orders for this visit:    1. Recurrent UTI (Primary)  -     Bladder Scan    We did discuss that the symptoms that she is expressing she has with urinary tract infections would be unusual with recurrent infections as it is typically burning, frequency, urgency.  We also discussed that her last couple of infections have been E. coli so d-mannose would be a good option for prevention of infections.  We also did advise that she should definitely continue her estradiol cream.    I will see her back in 6 months or sooner for new concerns.    Follow Up   Return in about 6 months (around 5/30/2024).  Patient was given instructions and counseling regarding her condition or for health maintenance advice. Please see specific information pulled into the AVS if appropriate.         This document has been electronically signed by UMER Olvera  November 30, 2023  10:05 EST

## 2023-11-30 ENCOUNTER — OFFICE VISIT (OUTPATIENT)
Dept: UROLOGY | Facility: CLINIC | Age: 53
End: 2023-11-30
Payer: COMMERCIAL

## 2023-11-30 VITALS
DIASTOLIC BLOOD PRESSURE: 67 MMHG | HEIGHT: 64 IN | WEIGHT: 188 LBS | BODY MASS INDEX: 32.1 KG/M2 | HEART RATE: 86 BPM | SYSTOLIC BLOOD PRESSURE: 104 MMHG

## 2023-11-30 DIAGNOSIS — N39.0 RECURRENT UTI: Primary | ICD-10-CM

## 2023-11-30 LAB — URINE VOLUME: 0

## 2024-01-05 ENCOUNTER — TRANSCRIBE ORDERS (OUTPATIENT)
Dept: ADMINISTRATIVE | Facility: HOSPITAL | Age: 54
End: 2024-01-05
Payer: COMMERCIAL

## 2024-01-05 DIAGNOSIS — Z12.31 SCREENING MAMMOGRAM FOR HIGH-RISK PATIENT: Primary | ICD-10-CM

## 2024-02-29 ENCOUNTER — HOSPITAL ENCOUNTER (OUTPATIENT)
Dept: MAMMOGRAPHY | Facility: HOSPITAL | Age: 54
Discharge: HOME | End: 2024-02-29
Admitting: NURSE PRACTITIONER
Payer: COMMERCIAL

## 2024-02-29 DIAGNOSIS — Z12.31 SCREENING MAMMOGRAM FOR HIGH-RISK PATIENT: ICD-10-CM

## 2024-02-29 PROCEDURE — 77067 SCR MAMMO BI INCL CAD: CPT

## 2024-02-29 PROCEDURE — 77063 BREAST TOMOSYNTHESIS BI: CPT

## 2024-05-29 ENCOUNTER — TELEPHONE (OUTPATIENT)
Dept: UROLOGY | Facility: CLINIC | Age: 54
End: 2024-05-29
Payer: COMMERCIAL

## 2025-01-21 ENCOUNTER — TRANSCRIBE ORDERS (OUTPATIENT)
Dept: ADMINISTRATIVE | Facility: HOSPITAL | Age: 55
End: 2025-01-21
Payer: COMMERCIAL

## 2025-01-21 DIAGNOSIS — Z12.31 OTHER SCREENING MAMMOGRAM: Primary | ICD-10-CM

## 2025-03-03 ENCOUNTER — HOSPITAL ENCOUNTER (OUTPATIENT)
Dept: MAMMOGRAPHY | Facility: HOSPITAL | Age: 55
Discharge: HOME | End: 2025-03-03
Admitting: NURSE PRACTITIONER
Payer: COMMERCIAL

## 2025-03-03 DIAGNOSIS — Z12.31 OTHER SCREENING MAMMOGRAM: ICD-10-CM

## 2025-03-03 PROCEDURE — 77067 SCR MAMMO BI INCL CAD: CPT

## 2025-03-03 PROCEDURE — 77063 BREAST TOMOSYNTHESIS BI: CPT

## 2025-03-29 ENCOUNTER — HOSPITAL ENCOUNTER (EMERGENCY)
Facility: HOSPITAL | Age: 55
Discharge: HOME | End: 2025-03-29
Attending: EMERGENCY MEDICINE
Payer: COMMERCIAL

## 2025-03-29 ENCOUNTER — APPOINTMENT (OUTPATIENT)
Dept: GENERAL RADIOLOGY | Facility: HOSPITAL | Age: 55
End: 2025-03-29
Payer: COMMERCIAL

## 2025-03-29 VITALS
HEART RATE: 80 BPM | HEIGHT: 65 IN | OXYGEN SATURATION: 95 % | TEMPERATURE: 99.3 F | RESPIRATION RATE: 16 BRPM | DIASTOLIC BLOOD PRESSURE: 56 MMHG | SYSTOLIC BLOOD PRESSURE: 106 MMHG | WEIGHT: 248 LBS | BODY MASS INDEX: 41.32 KG/M2

## 2025-03-29 DIAGNOSIS — M54.50 ACUTE BILATERAL LOW BACK PAIN WITHOUT SCIATICA: ICD-10-CM

## 2025-03-29 DIAGNOSIS — S32.010A COMPRESSION FRACTURE OF L1 LUMBAR VERTEBRA, CLOSED, INITIAL ENCOUNTER: ICD-10-CM
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
## 2025-03-29 PROCEDURE — 72100 X-RAY EXAM L-S SPINE 2/3 VWS: CPT

## 2025-03-29 PROCEDURE — 25010000002 KETOROLAC TROMETHAMINE PER 15 MG: Performed by: EMERGENCY MEDICINE

## 2025-03-29 PROCEDURE — 96372 THER/PROPH/DIAG INJ SC/IM: CPT

## 2025-03-29 PROCEDURE — 99283 EMERGENCY DEPT VISIT LOW MDM: CPT

## 2025-03-29 RX ORDER — HYDROCODONE BITARTRATE AND ACETAMINOPHEN 5; 325 MG/1; MG/1
1 TABLET ORAL EVERY 6 HOURS PRN
Qty: 12 TABLET | Refills: 0 | Status: SHIPPED | OUTPATIENT
Start: 2025-03-29 | End: 2025-03-29

## 2025-03-29 RX ORDER — IBUPROFEN 600 MG/1
600 TABLET, FILM COATED ORAL EVERY 8 HOURS PRN
Qty: 60 TABLET | Refills: 0 | Status: SHIPPED | OUTPATIENT
Start: 2025-03-29 | End: 2025-03-29

## 2025-03-29 RX ORDER — KETOROLAC TROMETHAMINE 30 MG/ML
30 INJECTION, SOLUTION INTRAMUSCULAR; INTRAVENOUS ONCE
Status: COMPLETED | OUTPATIENT
Start: 2025-03-29 | End: 2025-03-29

## 2025-03-29 RX ORDER — IBUPROFEN 600 MG/1
600 TABLET, FILM COATED ORAL EVERY 8 HOURS PRN
Qty: 60 TABLET | Refills: 0 | Status: SHIPPED | OUTPATIENT
Start: 2025-03-29

## 2025-03-29 RX ORDER — HYDROCODONE BITARTRATE AND ACETAMINOPHEN 5; 325 MG/1; MG/1
1 TABLET ORAL EVERY 6 HOURS PRN
Qty: 12 TABLET | Refills: 0 | Status: SHIPPED | OUTPATIENT
Start: 2025-03-29 | End: 2025-04-03

## 2025-03-29 RX ADMIN — KETOROLAC TROMETHAMINE 30 MG: 30 INJECTION, SOLUTION INTRAMUSCULAR; INTRAVENOUS at 12:50

## 2025-03-29 NOTE — ED PROVIDER NOTES
Time: 12:27 PM EDT  Date of encounter:  3/29/2025  Independent Historian/Clinical History and Information was obtained by:   Patient    History is limited by: N/A    Chief Complaint: fall, lower back pain      History of Present Illness:  Patient is a 54 y.o. year old female who presents to the emergency department for evaluation of lower back pain after falling at home in the  middle of the night. She says she was at the sink and fell, landing flat on her buttocks. She is having pain across her lower back that is worse when walking but is painful in any position. She denies any loss of bowel or bladder function, no saddle anesthesia. She is awake and alert and although she does appear somewhat uncomfortable she is in no acute distress. Ambulatory with a slow, deliberate gait in the ER.      Patient Care Team  Primary Care Provider: Anais Ashraf APRN    Past Medical History:     Allergies   Allergen Reactions    Penicillins Unknown - High Severity     AS A CHILD    Other Other (See Comments)     Past Medical History:   Diagnosis Date    Condition not found     Ulcer    Degeneration of lumbosacral intervertebral disc 04/28/2015    High cholesterol     Limb pain     Limb swelling     Pain, lumbar region     Radiculopathy, lumbosacral region 04/28/2015    Spondylisthesis 04/28/2015    lumbar L5/S1     Past Surgical History:   Procedure Laterality Date    ABDOMINAL HYSTERECTOMY      APPENDECTOMY      CHOLECYSTECTOMY      HERNIA REPAIR       Family History   Problem Relation Age of Onset    Heart disease Father     Diabetes Father     Cancer Maternal Grandfather     Heart disease Brother        Home Medications:  Prior to Admission medications    Medication Sig Start Date End Date Taking? Authorizing Provider   furosemide (LASIX) 20 MG tablet Take 1 tablet by mouth Daily As Needed.   Yes Tex Kirkland MD   atorvastatin (LIPITOR) 20 MG tablet Take 1 tablet by mouth Daily.    Tex Kirkland MD    baclofen (LIORESAL) 10 MG tablet Take 1 tablet by mouth every night at bedtime.    Tex Kirkland MD   buPROPion XL (WELLBUTRIN XL) 300 MG 24 hr tablet Take 1 tablet by mouth Every Morning. 10/15/22   Tex Kirkland MD   calcium citrate (CALCITRATE) 950 (200 Ca) MG tablet Take 1 tablet by mouth Daily. 8/25/23   Tex Kirkland MD   colestipol (COLESTID) 1 g tablet Take 1 tablet by mouth 2 (Two) Times a Day.    Tex Kirkland MD   estradiol (ESTRACE) 0.1 MG/GM vaginal cream APPLY A PEA SIZE AMOUNT VAGINALLY ONCE DAILY x3 WEEKS THEN TWICE A WEEK x2 WEEKS, THEN ONCE A WEEK x1 MONTH  Patient taking differently: Insert 2 applicators into the vagina 1 (One) Time Per Week. 3/14/23   Ofelia Betts MD   Ferrocite 324 MG tablet Take 1 tablet by mouth Every 12 (Twelve) Hours. 8/8/23   Tex Kirkland MD   folic acid (FOLVITE) 1 MG tablet Take 1 tablet by mouth Daily. 3/22/22   Tex Kirkland MD   gabapentin (NEURONTIN) 100 MG capsule Take 1 capsule by mouth 2 (Two) Times a Day.    Tex Kirkland MD   Lactobacillus (Florajen Acidophilus) capsule Take 1 capsule by mouth 2 (Two) Times a Day. 1/14/23   Aniya Stephen MD   levothyroxine (SYNTHROID, LEVOTHROID) 50 MCG tablet Take 1 tablet by mouth Daily. 6/13/22   Emergency, Nurse Epic, RN   liothyronine (CYTOMEL) 5 MCG tablet Take 0.5 tablets by mouth 2 (Two) Times a Day. 3/22/22   Tex Kirkland MD   loratadine (CLARITIN) 10 MG tablet Claritin 10 mg oral tablet take 1 tablet (10 mg) by oral route once daily   Active    Tex Kirkland MD   multivitamin (THERAGRAN) tablet tablet     Tex Kirkland MD   naproxen (Naprosyn) 500 MG tablet Take 1 tablet by mouth 2 (Two) Times a Day With Meals. 1/14/23   Aniya Stephen MD   omeprazole (priLOSEC) 40 MG capsule Take 1 capsule by mouth Daily. BID 6/29/21   Tex Kirkland MD   ondansetron ODT (ZOFRAN-ODT) 4 MG disintegrating tablet Place 1 tablet on the  tongue Every 8 (Eight) Hours As Needed for Nausea or Vomiting. 10/6/23   Kaitlin Granados PA-C   oxybutynin XL (DITROPAN-XL) 5 MG 24 hr tablet Take 1 tablet by mouth Daily. 21   Tex Kirkland MD   phenazopyridine (PYRIDIUM) 200 MG tablet Take 1 tablet by mouth 3 (Three) Times a Day As Needed for Bladder Spasms or Dysuria. 10/6/23   Kaitlin Granados PA-C   potassium chloride 10 MEQ CR tablet Take 1 tablet by mouth As Needed. 22   Emergency, Nurse Epic, RN   rOPINIRole (REQUIP) 1 MG tablet Take 1 tablet by mouth Every Night. 22   Emergency, Nurse Katherine RN   sertraline (ZOLOFT) 100 MG tablet Take 1 tablet by mouth Daily. 10/17/22   Tex Kirkland MD   SUMAtriptan (IMITREX) 25 MG tablet Take 1 tablet by mouth As Needed.    Tex Kirkland MD   traZODone (DESYREL) 150 MG tablet  10/5/23   Tex Krikland MD   Vivitrol 380 MG reconstituted suspension IM suspension 380 mg Daily. 23   Tex Kirkland MD   Vraylar 1.5 MG capsule capsule Take 1 capsule by mouth Daily. 22   Emergency, Nurse Katherine RN        Social History:   Social History     Tobacco Use    Smoking status: Former     Current packs/day: 0.00     Types: Cigarettes     Quit date:      Years since quittin.2    Smokeless tobacco: Former    Tobacco comments:     smoked 21-30 years   Vaping Use    Vaping status: Never Used   Substance Use Topics    Alcohol use: Not Currently    Drug use: Never         Review of Systems:  Review of Systems   Constitutional: Negative.    HENT: Negative.     Eyes: Negative.    Respiratory: Negative.     Cardiovascular: Negative.    Gastrointestinal: Negative.    Endocrine: Negative.    Genitourinary: Negative.    Musculoskeletal:  Positive for back pain.   Skin: Negative.    Allergic/Immunologic: Negative.    Neurological: Negative.    Hematological: Negative.    Psychiatric/Behavioral: Negative.          Physical Exam:  /56 (BP Location: Left arm, Patient Position: Sitting)  "  Pulse 80   Temp 99.3 °F (37.4 °C) (Oral)   Resp 16   Ht 165.1 cm (65\")   Wt 112 kg (248 lb)   SpO2 95%   BMI 41.27 kg/m²     Physical Exam  Constitutional:       Appearance: Normal appearance.   HENT:      Head: Normocephalic and atraumatic.      Nose: Nose normal.      Mouth/Throat:      Mouth: Mucous membranes are moist.   Eyes:      Pupils: Pupils are equal, round, and reactive to light.   Cardiovascular:      Rate and Rhythm: Normal rate and regular rhythm.      Pulses: Normal pulses.   Pulmonary:      Effort: Pulmonary effort is normal.      Breath sounds: Normal breath sounds.   Abdominal:      General: Abdomen is flat. Bowel sounds are normal.      Palpations: Abdomen is soft.   Musculoskeletal:      Cervical back: Normal range of motion.      Lumbar back: Tenderness and bony tenderness present. Decreased range of motion.        Back:    Skin:     General: Skin is warm and dry.      Capillary Refill: Capillary refill takes less than 2 seconds.   Neurological:      General: No focal deficit present.      Mental Status: She is alert and oriented to person, place, and time. Mental status is at baseline.   Psychiatric:         Mood and Affect: Mood normal.                    Medical Decision Making:      Comorbidities that affect care:    Obesity    External Notes reviewed:    None      The following orders were placed and all results were independently analyzed by me:  Orders Placed This Encounter   Procedures    XR Spine Lumbar 2 or 3 View    Ambulatory Referral to Neurosurgery       Medications Given in the Emergency Department:  Medications   ketorolac (TORADOL) injection 30 mg (30 mg Intramuscular Given 3/29/25 1250)        ED Course:         Labs:    Lab Results (last 24 hours)       ** No results found for the last 24 hours. **             Imaging:    XR Spine Lumbar 2 or 3 View  Result Date: 3/29/2025  XR SPINE LUMBAR 2 OR 3 VW Date of Exam: 3/29/2025 12:07 PM EDT Indication: fall/ lower back " pain / difficulty walking Comparison: 3/12/2018. Findings: There is new height loss at the superior endplate of L1, somewhat age indeterminate but potentially consistent with acute compression fracture given provided history. Height loss is mild, less than 25%, without significant bony retropulsion. Spondylosis changes otherwise appear similar, including prominent anterolisthesis of L5 on S1. There is no traumatic malalignment.     Impression: Mild height loss along the superior endplate of L1, somewhat age indeterminant but potentially reflecting acute compression fracture given provided history. Height loss is minimal and there is no evidence of significant bony retropulsion or traumatic malalignment. Electronically Signed: Ronald Hopson MD  3/29/2025 12:31 PM EDT  Workstation ID: [id number]        Differential Diagnosis and Discussion:    Back Pain: The patient presents with back pain. My differential diagnosis includes but is not limited to acute spinal epidural abscess, acute spinal epidural bleed, cauda equina syndrome, abdominal aortic aneurysm, aortic dissection, kidney stone, pyelonephritis, musculoskeletal back pain, spinal fracture, and osteoarthritis.     PROCEDURES:    X-ray were performed in the emergency department and all X-ray impressions were independently interpreted by me.    No orders to display       Procedures    MDM     Amount and/or Complexity of Data Reviewed  Tests in the radiology section of CPT®: reviewed                       Patient Care Considerations:           Consultants/Shared Management Plan:    None    Social Determinants of Health:    Patient is independent, reliable, and has access to care.       Disposition and Care Coordination:    Discharged: The patient is suitable and stable for discharge with no need for consideration of admission.    I have explained the patient´s condition, diagnoses and treatment plan based on the information available to me at this time. I have  answered questions and addressed any concerns. The patient has a good  understanding of the patient´s diagnosis, condition, and treatment plan as can be expected at this point. The vital signs have been stable. The patient´s condition is stable and appropriate for discharge from the emergency department.      The patient will pursue further outpatient evaluation with the primary care physician or other designated or consulting physician as outlined in the discharge instructions. They are agreeable to this plan of care and follow-up instructions have been explained in detail. The patient has received these instructions in written format and has expressed an understanding of the discharge instructions. The patient is aware that any significant change in condition or worsening of symptoms should prompt an immediate return to this or the closest emergency department or call to 911.  I have explained discharge medications and the need for follow up with the patient/caretakers. This was also printed in the discharge instructions. Patient was discharged with the following medications and follow up:      Medication List        New Prescriptions      HYDROcodone-acetaminophen 5-325 MG per tablet  Commonly known as: NORCO  Take 1 tablet by mouth Every 6 (Six) Hours As Needed for Moderate Pain or Severe Pain.     ibuprofen 600 MG tablet  Commonly known as: ADVIL,MOTRIN  Take 1 tablet by mouth Every 8 (Eight) Hours As Needed for Moderate Pain.            Changed      estradiol 0.1 MG/GM vaginal cream  Commonly known as: ESTRACE  APPLY A PEA SIZE AMOUNT VAGINALLY ONCE DAILY x3 WEEKS THEN TWICE A WEEK x2 WEEKS, THEN ONCE A WEEK x1 MONTH  What changed: See the new instructions.            Stop      Vivitrol 380 MG reconstituted suspension IM suspension  Generic drug: Naltrexone               Where to Get Your Medications        These medications were sent to [address] [phone]  PH - [phone] FX  [address] [address]      Phone: [phone]   HYDROcodone-acetaminophen 5-325 MG per tablet  ibuprofen 600 MG tablet      Anais Ashraf, APRN  [address] [facility]  [address]  [place] [address]  [phone]    In 3 days      Shiva Ortiz MD  [address]  [place] [address]  [phone]    Schedule an appointment as soon as possible for a visit       Shiva Ortiz MD  [address] [address]  [phone]             Final diagnoses:   Fall on same level as cause of accidental injury   Compression fracture of L1 lumbar vertebra, closed, initial encounter   Acute bilateral low back pain without sciatica        ED Disposition       ED Disposition   Discharge    Condition   Stable    Comment   --               This medical record created using voice recognition software.             Carmelita Lauren, APRN  03/29/25 1834

## 2025-04-03 ENCOUNTER — OFFICE VISIT (OUTPATIENT)
Dept: NEUROSURGERY | Facility: CLINIC | Age: 55
End: 2025-04-03
Payer: COMMERCIAL

## 2025-04-03 VITALS
BODY MASS INDEX: 40.48 KG/M2 | HEART RATE: 71 BPM | DIASTOLIC BLOOD PRESSURE: 71 MMHG | HEIGHT: 65 IN | WEIGHT: 243 LBS | SYSTOLIC BLOOD PRESSURE: 133 MMHG

## 2025-04-03 DIAGNOSIS — S32.010A CLOSED COMPRESSION FRACTURE OF L1 LUMBAR VERTEBRA, INITIAL ENCOUNTER: Primary | ICD-10-CM

## 2025-04-03 DIAGNOSIS — M54.42 ACUTE BILATERAL LOW BACK PAIN WITH LEFT-SIDED SCIATICA: ICD-10-CM

## 2025-04-03 RX ORDER — FLUCONAZOLE 150 MG/1
TABLET ORAL
COMMUNITY

## 2025-04-03 RX ORDER — LIDOCAINE HYDROCHLORIDE 20 MG/ML
SOLUTION OROPHARYNGEAL
COMMUNITY
Start: 2025-02-20

## 2025-04-03 RX ORDER — NALTREXONE HYDROCHLORIDE AND BUPROPION HYDROCHLORIDE 8; 90 MG/1; MG/1
TABLET, EXTENDED RELEASE ORAL
COMMUNITY
Start: 2024-12-20

## 2025-04-03 RX ORDER — PROMETHAZINE HYDROCHLORIDE 25 MG/1
25 TABLET ORAL
COMMUNITY

## 2025-04-03 RX ORDER — MIRABEGRON 50 MG/1
1 TABLET, FILM COATED, EXTENDED RELEASE ORAL DAILY
COMMUNITY
Start: 2024-12-20

## 2025-04-03 RX ORDER — PNV NO.95/FERROUS FUM/FOLIC AC 28MG-0.8MG
TABLET ORAL
COMMUNITY

## 2025-04-03 NOTE — PROGRESS NOTES
"Chief Complaint  new patient (PT states pain level is about an 8. ) and Back Pain ( Fall on same level as cause of accidental injury on Saturday 3.29.25  Acute bilateral low back pain without sciatica, Compression fracture of L1 lumbar vertebra, closed, initial encounter, XR LUMBAR 03/29/25 @ [facility])    Subjective          Vaishnavi Moses who is a 54 y.o. year old female who presents to [facility] for Evaluation of the Spine.     The patient complains of pain located in the Lumbar Spine.  Patients states the pain has been present for 5 days.  The pain came on acutely.  The pain scaled level is 8.  The pain does radiate. Dermatomes are located on left Lumbar at: below the knee. The leg pain is intermittent. The back pain is constant and waxing/waning and described as sharp.  The pain is worse in the morning. Patient states  standing up and leaning over on her counter makes the pain better.  Patient states Prolonged Sitting, Bending, and getting out of bed makes the pain worse.    Associated Symptoms Include: Denies Numbness, Tingling, Weakness, and Loss of Bowel or Bladder Control  Conservative Interventions Include: NSAIDs that were somewhat effective. Norco was somewhat effective.    Was this the result of an injury or accident?: Yes, Fall about 5 days ago.    History of Previous Spinal Surgery?: No     reports that she quit smoking about 2 years ago. Her smoking use included cigarettes. She has quit using smokeless tobacco.    Review of Systems   Musculoskeletal:  Positive for back pain.        Left leg pain intermittently just past the knee        Objective   Vital Signs:   /71 (BP Location: Left arm, Patient Position: Sitting, Cuff Size: Large Adult)   Pulse 71   Ht 165.1 cm (65\")   Wt 110 kg (243 lb)   BMI 40.44 kg/m²       Physical Exam  Constitutional:       Appearance: Normal appearance. She is obese.   Pulmonary:      Effort: Pulmonary effort is " normal.   Musculoskeletal:         General: Tenderness (midline lumbar spine around L1) present.      Comments: SLR negative bilaterally   Neurological:      General: No focal deficit present.      Mental Status: She is alert and oriented to person, place, and time.      Sensory: No sensory deficit.      Motor: No weakness.      Deep Tendon Reflexes: Reflexes normal.   Psychiatric:         Mood and Affect: Mood normal.         Behavior: Behavior normal.        Neurological Exam  Mental Status  Alert. Oriented to person, place, and time.      Result Review     I have personally interpreted the x-ray of the lumbar spine from 3/29/2025 which shows superior endplate compression deformity at L1.     Assessment and Plan    Diagnoses and all orders for this visit:    1. Closed compression fracture of L1 lumbar vertebra, initial encounter (Primary)  -     XR Spine Lumbar 2 or 3 View; Future    2. Acute bilateral low back pain with left-sided sciatica  -     XR Spine Lumbar 2 or 3 View; Future    She has pain in the lumbar spine and left leg just past the knee. The leg pain is intermittent while back pain has been persistent.    Her pain began after a fall 5 days ago.    She has an L1 fracture on x-ray.    She is deferring back bracing today, although it was discussed as part of conservative treatment.    I am recommending the following restrictions: No heavy lifting greater than 5 lb, limit twisting and bending at the waist, avoidance of strenuous activity. She will follow these restrictions for a minimum of 11 weeks.    She will monitor for new or worsening complaints and notify us of change.    I will forward a medication request to Dr. Ortiz.    She will follow-up in 4 weeks to reassess with x-ray 2-3 days in advance to monitor the L1 bone.    Follow Up   Return in about 4 weeks (around 5/1/2025).  Patient was given instructions and counseling regarding her condition or for health maintenance advice. Please see  specific information pulled into the AVS if appropriate.

## 2025-04-04 ENCOUNTER — TELEPHONE (OUTPATIENT)
Dept: NEUROSURGERY | Facility: CLINIC | Age: 55
End: 2025-04-04
Payer: COMMERCIAL

## 2025-04-04 NOTE — TELEPHONE ENCOUNTER
PT CALLED IN ASKING FOR UPDATE ON PAIN MEDS, STATES SPOKE ABOUT THIS AT HER APPT WITH DACIA CHRISTIANSON ON 04/03/25. NO NOTES ON THIS OR VISIBLE REQUEST FOR MEDS IN CHART. PT IS REQUESTING SOMETHING FOR PAIN AND MENTIONS NORCO AS CURRENT MEDS ARE NOT EFFECTIVE. PLEASE ADVISE.    Vaishnavi Moses   [phone]   AVAILABLE ANYTIME  PLEASE [employer] AND/OR [employer] MESSAGE

## 2025-04-04 NOTE — TELEPHONE ENCOUNTER
Pt is taking a medication with Naloxone in it (Contrave) with high risk of using narcotic medications.

## 2025-04-07 ENCOUNTER — TRANSCRIBE ORDERS (OUTPATIENT)
Dept: ADMINISTRATIVE | Facility: HOSPITAL | Age: 55
End: 2025-04-07
Payer: COMMERCIAL

## 2025-04-07 DIAGNOSIS — M85.80 OTHER SPECIFIED DISORDERS OF BONE DENSITY AND STRUCTURE, UNSPECIFIED SITE: Primary | ICD-10-CM

## 2025-06-17 ENCOUNTER — HOSPITAL ENCOUNTER (OUTPATIENT)
Dept: OTHER | Facility: HOSPITAL | Age: 55
Discharge: HOME | End: 2025-06-17

## 2025-06-18 DIAGNOSIS — S32.010A CLOSED COMPRESSION FRACTURE OF L1 LUMBAR VERTEBRA, INITIAL ENCOUNTER: ICD-10-CM

## 2025-06-18 DIAGNOSIS — M54.42 ACUTE BILATERAL LOW BACK PAIN WITH LEFT-SIDED SCIATICA: ICD-10-CM

## 2025-06-20 ENCOUNTER — TELEPHONE (OUTPATIENT)
Dept: NEUROSURGERY | Facility: CLINIC | Age: 55
End: 2025-06-20
Payer: COMMERCIAL

## 2025-06-20 NOTE — TELEPHONE ENCOUNTER
PT CALLED FELL ON 06/19/25 WANTS TO KNOW IF YOU WOULD LIKE TO GET ANOTHER XRAY OR FOR THE PT TO JUST COME IN AND EXPLAIN THE SITUATION. PLEASE ADVISE.

## 2025-06-20 NOTE — TELEPHONE ENCOUNTER
I saw her last nearly 2 months ago and planned for a 4 weeks follow, so she is due for follow-up regardless.

## 2025-06-25 ENCOUNTER — OFFICE VISIT (OUTPATIENT)
Dept: NEUROSURGERY | Facility: CLINIC | Age: 55
End: 2025-06-25
Payer: COMMERCIAL

## 2025-06-25 VITALS
WEIGHT: 219 LBS | DIASTOLIC BLOOD PRESSURE: 85 MMHG | HEIGHT: 65 IN | SYSTOLIC BLOOD PRESSURE: 133 MMHG | BODY MASS INDEX: 36.49 KG/M2 | HEART RATE: 79 BPM

## 2025-06-25 DIAGNOSIS — S32.010D CLOSED COMPRESSION FRACTURE OF L1 LUMBAR VERTEBRA WITH ROUTINE HEALING, SUBSEQUENT ENCOUNTER: Primary | ICD-10-CM

## 2025-06-25 RX ORDER — IBUPROFEN 600 MG/1
600 TABLET, FILM COATED ORAL EVERY 8 HOURS PRN
Qty: 60 TABLET | Refills: 0 | Status: SHIPPED | OUTPATIENT
Start: 2025-06-25

## 2025-06-25 NOTE — PROGRESS NOTES
Patient being seen for today for Follow-up (XR Spine Lumbar 2 or 3 View completed on 6/17/2025)  .    Subjective    Vaishnavi Moses is a 55 y.o. female that presents with Follow-up (XR Spine Lumbar 2 or 3 View completed on 6/17/2025)  .    HPI  Previously: Last seen on 4/3/2025 for pain in the lumbar spine and intermittent left leg pain just past the knee which began after a fall approximately 5 days prior.  On x-ray there was an L1 fracture visible.  We discussed but she was deferring back bracing.  There was recommendation for physical restrictions for a minimum of 11 weeks.  There is a refill request for pain medication forwarded to Dr. Ortiz at that time.  There was plan to follow-up in 4 weeks with x-ray to reassess.  She was lost to follow-up at that time but there was telephone encounter on 6/20/2025 reporting patient fell again on 6/19/2025, at which time follow-up was rescheduled.    Today: She reports her pain is no worse today, the pain is also not better. She denies new or changed complaints otherwise.     reports that she quit smoking about 2 years ago. Her smoking use included cigarettes. She has quit using smokeless tobacco.    Review of Systems   Musculoskeletal:  Positive for back pain.       Objective   Vitals:    06/25/25 1011   BP: 133/85   Pulse: 79        Physical Exam  Constitutional:       Appearance: Normal appearance. She is obese.   Pulmonary:      Effort: Pulmonary effort is normal.   Musculoskeletal:         General: No tenderness.      Comments: SLR negative bilaterally   Neurological:      General: No focal deficit present.      Mental Status: She is alert and oriented to person, place, and time.      Sensory: No sensory deficit.      Motor: No weakness.      Deep Tendon Reflexes: Reflexes normal.   Psychiatric:         Mood and Affect: Mood normal.         Behavior: Behavior normal.          Result Review   I have independently reviewed the x-ray of lumbar spine from 6/17/2025  which shows anterolisthesis of L5 on S1.  The L1 fracture appears somewhat worsened compared to the x-ray on 3/29/2025.     Assessment and Plan {CC Problem List  Visit Diagnosis  ROS  Review (Popup)  [facility] [place]  Quality  BestPractice  Medications  SmartSets  [employer] Encounters  Media :23}   Diagnoses and all orders for this visit:    1. Closed compression fracture of L1 lumbar vertebra with routine healing, subsequent encounter (Primary)  -     XR Spine Lumbar 2 or 3 View; Future    Other orders  -     ibuprofen (ADVIL,MOTRIN) 600 MG tablet; Take 1 tablet by mouth Every 8 (Eight) Hours As Needed for Moderate Pain.  Dispense: 60 tablet; Refill: 0    She was briefly lost to follow-up.    The previous L1 fracture does appear slightly worsened on the most recent x-ray from 6/17/25 vs the previous x-ray on 3/29/25.    In addition, she is reporting a fall on 6/19/25 after the most recent x-ray.    She may consider a repeat x-ray today to monitor for new or worsening fracture, otherwise she may continue with conservative treatment over the next 4 weeks and repeat the x-ray then.    I would have her continue physical restrictions for the next 4 weeks.    I am recommending the following restrictions: No heavy lifting greater than 5 lb, limit twisting and bending at the waist, avoidance of strenuous activity.    She will follow-up in 4 weeks to reassess.    She will complete x-ray 2-3 days before the follow-up to monitor the L1 bone.    Follow Up {Instructions Charge Capture  Follow-up Communications :23}   Return in about 4 weeks (around 7/23/2025).

## 2025-07-21 ENCOUNTER — HOSPITAL ENCOUNTER (OUTPATIENT)
Dept: OTHER | Facility: HOSPITAL | Age: 55
Discharge: HOME | End: 2025-07-21
Payer: COMMERCIAL

## 2025-07-22 ENCOUNTER — OFFICE VISIT (OUTPATIENT)
Dept: NEUROSURGERY | Facility: CLINIC | Age: 55
End: 2025-07-22
Payer: COMMERCIAL

## 2025-07-22 VITALS
HEIGHT: 65 IN | DIASTOLIC BLOOD PRESSURE: 80 MMHG | HEART RATE: 72 BPM | WEIGHT: 211 LBS | SYSTOLIC BLOOD PRESSURE: 123 MMHG | BODY MASS INDEX: 35.16 KG/M2

## 2025-07-22 DIAGNOSIS — S32.010D CLOSED COMPRESSION FRACTURE OF L1 LUMBAR VERTEBRA WITH ROUTINE HEALING, SUBSEQUENT ENCOUNTER: Primary | ICD-10-CM

## 2025-07-22 DIAGNOSIS — S32.010D CLOSED COMPRESSION FRACTURE OF L1 LUMBAR VERTEBRA WITH ROUTINE HEALING, SUBSEQUENT ENCOUNTER: ICD-10-CM

## 2025-07-22 DIAGNOSIS — M43.06 PARS DEFECT OF LUMBAR SPINE: ICD-10-CM

## 2025-07-22 DIAGNOSIS — M43.17 SPONDYLOLISTHESIS AT L5-S1 LEVEL: ICD-10-CM

## 2025-07-22 NOTE — PROGRESS NOTES
Patient being seen for today for Follow-up (XR Spine Lumbar 2 or 3 View completed on 7/21/2025)  .    Subjective    Vaishnavi Moses is a 55 y.o. female that presents with Follow-up (XR Spine Lumbar 2 or 3 View completed on 7/21/2025)  .    HPI  Previously: Last seen on 6/25/2025 for L1 fracture.  The L1 fracture at that time was appearing slightly worsened on x-ray from 6/17/2025.  She was also reporting a new fall on 6/19/2025.  We discussed having a new x-ray to assess for new or worsening fracture which she deferred.  There is plan to continue restrictions for 4 weeks and then follow-up with x-ray at that time to monitor the L1 bone.    Today: She rates her pain 2/10 today.    She denies new or changed complaints otherwise.     reports that she quit smoking about 2 years ago. Her smoking use included cigarettes. She has quit using smokeless tobacco.    Review of Systems   Musculoskeletal:  Positive for back pain.       Objective   Vitals:    07/22/25 1248   BP: 123/80   Pulse: 72        Physical Exam  Constitutional:       Appearance: Normal appearance. She is obese.   Pulmonary:      Effort: Pulmonary effort is normal.   Musculoskeletal:         General: No tenderness.      Comments: SLR negative bilaterally   Neurological:      General: No focal deficit present.      Mental Status: She is alert and oriented to person, place, and time.      Sensory: No sensory deficit.      Motor: No weakness.      Deep Tendon Reflexes: Reflexes normal.   Psychiatric:         Mood and Affect: Mood normal.         Behavior: Behavior normal.          Result Review   I have independently interpreted the x-ray of the lumbar spine from 7/21/2025 which shows a stable appearing superior endplate deformity at L1.  There is anterolisthesis of L5 on S1 with bilateral pars defects.  There is severe disc space narrowing at L5-S1.     Assessment and Plan {CC Problem List  Visit Diagnosis  ROS  Review (Popup)  Health Maintenance   [facility]  BestPractice  Medications  SmartSets  SnapShot Encounters  Media :23}   Diagnoses and all orders for this visit:    1. Closed compression fracture of L1 lumbar vertebra with routine healing, subsequent encounter (Primary)    2. Spondylolisthesis at L5-S1 level    3. Pars defect of lumbar spine    She denies new or worsening pain.    Her pain is improved but not resolved.    The L1 bone is stable on x-ray.    We discussed MRI to assess for ongoing edema, which she is deferring today.    She may consider PT. She is deferring this today.    Given the time that has past since the bone was injured, we are considering it healed.    She will resume normal activity as tolerated.     She monitor for new or worsening complaints and notify us of change.    Follow Up {Instructions Charge Capture  Follow-up Communications :23}   Return if symptoms worsen or fail to improve.

## 2025-08-04 ENCOUNTER — HOSPITAL ENCOUNTER (OUTPATIENT)
Dept: OTHER | Facility: HOSPITAL | Age: 55
Discharge: HOME | End: 2025-08-04

## 2025-08-06 ENCOUNTER — TRANSCRIBE ORDERS (OUTPATIENT)
Dept: ADMINISTRATIVE | Facility: HOSPITAL | Age: 55
End: 2025-08-06
Payer: COMMERCIAL

## 2025-08-06 DIAGNOSIS — I73.9 PERIPHERAL VASCULAR DISEASE, UNSPECIFIED: Primary | ICD-10-CM

## 2025-08-12 ENCOUNTER — TRANSCRIBE ORDERS (OUTPATIENT)
Dept: ADMINISTRATIVE | Facility: HOSPITAL | Age: 55
End: 2025-08-12
Payer: COMMERCIAL

## 2025-08-12 DIAGNOSIS — R91.8 OTHER NONSPECIFIC ABNORMAL FINDING OF LUNG FIELD: Primary | ICD-10-CM

## 2025-08-18 ENCOUNTER — HOSPITAL ENCOUNTER (OUTPATIENT)
Dept: PET IMAGING | Facility: HOSPITAL | Age: 55
Discharge: HOME | End: 2025-08-18
Payer: COMMERCIAL

## 2025-08-18 DIAGNOSIS — R91.8 OTHER NONSPECIFIC ABNORMAL FINDING OF LUNG FIELD: ICD-10-CM

## 2025-08-18 PROCEDURE — 78815 PET IMAGE W/CT SKULL-THIGH: CPT

## 2025-08-18 PROCEDURE — 34310000005 FLUDEOXYGLUCOSE F18 SOLUTION: Performed by: NURSE PRACTITIONER

## 2025-08-18 PROCEDURE — A9552 F18 FDG: HCPCS | Performed by: NURSE PRACTITIONER

## 2025-08-18 RX ADMIN — FLUDEOXYGLUCOSE F 18 1 DOSE: 200 INJECTION, SOLUTION INTRAVENOUS at 11:47

## 2025-08-22 ENCOUNTER — TRANSCRIBE ORDERS (OUTPATIENT)
Dept: ADMINISTRATIVE | Facility: HOSPITAL | Age: 55
End: 2025-08-22
Payer: COMMERCIAL

## 2025-08-22 DIAGNOSIS — M80.88XA OTHER OSTEOPOROSIS WITH CURRENT PATHOLOGICAL FRACTURE, VERTEBRA(E), INITIAL ENCOUNTER FOR FRACTURE: Primary | ICD-10-CM
